# Patient Record
Sex: MALE | Race: WHITE | NOT HISPANIC OR LATINO | Employment: OTHER | URBAN - NONMETROPOLITAN AREA
[De-identification: names, ages, dates, MRNs, and addresses within clinical notes are randomized per-mention and may not be internally consistent; named-entity substitution may affect disease eponyms.]

---

## 2018-01-01 ENCOUNTER — APPOINTMENT (OUTPATIENT)
Dept: GENERAL RADIOLOGY | Facility: HOSPITAL | Age: 83
End: 2018-01-01

## 2018-01-01 ENCOUNTER — APPOINTMENT (OUTPATIENT)
Dept: CT IMAGING | Facility: HOSPITAL | Age: 83
End: 2018-01-01

## 2018-01-01 ENCOUNTER — APPOINTMENT (OUTPATIENT)
Dept: CARDIOLOGY | Facility: HOSPITAL | Age: 83
End: 2018-01-01
Attending: FAMILY MEDICINE

## 2018-01-01 ENCOUNTER — HOSPITAL ENCOUNTER (INPATIENT)
Facility: HOSPITAL | Age: 83
LOS: 1 days | End: 2018-09-22
Attending: INTERNAL MEDICINE | Admitting: INTERNAL MEDICINE

## 2018-01-01 ENCOUNTER — HOSPITAL ENCOUNTER (INPATIENT)
Facility: HOSPITAL | Age: 83
LOS: 9 days | End: 2018-09-21
Attending: EMERGENCY MEDICINE | Admitting: INTERNAL MEDICINE

## 2018-01-01 ENCOUNTER — TELEPHONE (OUTPATIENT)
Dept: GENERAL RADIOLOGY | Facility: HOSPITAL | Age: 83
End: 2018-01-01

## 2018-01-01 ENCOUNTER — APPOINTMENT (OUTPATIENT)
Dept: ULTRASOUND IMAGING | Facility: HOSPITAL | Age: 83
End: 2018-01-01

## 2018-01-01 VITALS
TEMPERATURE: 97.2 F | HEIGHT: 68 IN | BODY MASS INDEX: 23.79 KG/M2 | DIASTOLIC BLOOD PRESSURE: 62 MMHG | WEIGHT: 157 LBS | HEART RATE: 100 BPM | RESPIRATION RATE: 20 BRPM | OXYGEN SATURATION: 89 % | SYSTOLIC BLOOD PRESSURE: 136 MMHG

## 2018-01-01 DIAGNOSIS — J18.9 PNEUMONIA OF RIGHT UPPER LOBE DUE TO INFECTIOUS ORGANISM: Primary | ICD-10-CM

## 2018-01-01 DIAGNOSIS — R09.02 HYPOXIA: ICD-10-CM

## 2018-01-01 DIAGNOSIS — J90 PLEURAL EFFUSION: ICD-10-CM

## 2018-01-01 DIAGNOSIS — C61 PROSTATE CANCER (HCC): Primary | ICD-10-CM

## 2018-01-01 DIAGNOSIS — R97.20 ELEVATED PROSTATE SPECIFIC ANTIGEN (PSA): ICD-10-CM

## 2018-01-01 LAB
ALBUMIN SERPL-MCNC: 3.7 G/DL (ref 3.4–4.8)
ALBUMIN/GLOB SERPL: 0.9 G/DL (ref 1.1–1.8)
ALP SERPL-CCNC: 120 U/L (ref 38–126)
ALT SERPL W P-5'-P-CCNC: 26 U/L (ref 21–72)
ANION GAP SERPL CALCULATED.3IONS-SCNC: 11 MMOL/L (ref 5–15)
ANION GAP SERPL CALCULATED.3IONS-SCNC: 12 MMOL/L (ref 5–15)
ANION GAP SERPL CALCULATED.3IONS-SCNC: 12 MMOL/L (ref 5–15)
ANION GAP SERPL CALCULATED.3IONS-SCNC: 6 MMOL/L (ref 5–15)
ANION GAP SERPL CALCULATED.3IONS-SCNC: 7 MMOL/L (ref 5–15)
ANION GAP SERPL CALCULATED.3IONS-SCNC: 7 MMOL/L (ref 5–15)
ANION GAP SERPL CALCULATED.3IONS-SCNC: 8 MMOL/L (ref 5–15)
ANION GAP SERPL CALCULATED.3IONS-SCNC: 8 MMOL/L (ref 5–15)
ANION GAP SERPL CALCULATED.3IONS-SCNC: 9 MMOL/L (ref 5–15)
APPEARANCE FLD: ABNORMAL
ARTERIAL PATENCY WRIST A: ABNORMAL
ARTERIAL PATENCY WRIST A: POSITIVE
AST SERPL-CCNC: 28 U/L (ref 17–59)
ATMOSPHERIC PRESS: 748 MMHG
ATMOSPHERIC PRESS: 750 MMHG
ATMOSPHERIC PRESS: 751 MMHG
B PERT DNA SPEC QL NAA+PROBE: NOT DETECTED
BACTERIA FLD CULT: NORMAL
BACTERIA SPEC AEROBE CULT: NORMAL
BACTERIA SPEC AEROBE CULT: NORMAL
BASE EXCESS BLDA CALC-SCNC: -1.4 MMOL/L (ref 0–2)
BASE EXCESS BLDA CALC-SCNC: -2.9 MMOL/L (ref 0–2)
BASE EXCESS BLDA CALC-SCNC: -2.9 MMOL/L (ref 0–2)
BASE EXCESS BLDA CALC-SCNC: 0.7 MMOL/L (ref 0–2)
BASE EXCESS BLDA CALC-SCNC: 6.6 MMOL/L (ref 0–2)
BASOPHILS # BLD AUTO: 0 10*3/MM3 (ref 0–0.2)
BASOPHILS # BLD AUTO: 0.01 10*3/MM3 (ref 0–0.2)
BASOPHILS # BLD AUTO: 0.02 10*3/MM3 (ref 0–0.2)
BASOPHILS NFR BLD AUTO: 0 % (ref 0–2)
BASOPHILS NFR BLD AUTO: 0.1 % (ref 0–2)
BDY SITE: ABNORMAL
BH CV ECHO MEAS - ACS: 1.5 CM
BH CV ECHO MEAS - AO ISTHMUS: 2.5 CM
BH CV ECHO MEAS - AO MAX PG (FULL): 9.9 MMHG
BH CV ECHO MEAS - AO MAX PG: 19.7 MMHG
BH CV ECHO MEAS - AO MEAN PG (FULL): 5 MMHG
BH CV ECHO MEAS - AO MEAN PG: 10 MMHG
BH CV ECHO MEAS - AO ROOT AREA (BSA CORRECTED): 1.8
BH CV ECHO MEAS - AO ROOT AREA: 9.1 CM^2
BH CV ECHO MEAS - AO ROOT DIAM: 3.4 CM
BH CV ECHO MEAS - AO V2 MAX: 222 CM/SEC
BH CV ECHO MEAS - AO V2 MEAN: 144 CM/SEC
BH CV ECHO MEAS - AO V2 VTI: 29.3 CM
BH CV ECHO MEAS - ASC AORTA: 2.9 CM
BH CV ECHO MEAS - AVA(I,A): 2.8 CM^2
BH CV ECHO MEAS - AVA(I,D): 2.8 CM^2
BH CV ECHO MEAS - AVA(V,A): 2.4 CM^2
BH CV ECHO MEAS - AVA(V,D): 2.4 CM^2
BH CV ECHO MEAS - BSA(HAYCOCK): 1.9 M^2
BH CV ECHO MEAS - BSA: 1.9 M^2
BH CV ECHO MEAS - BZI_BMI: 25.2 KILOGRAMS/M^2
BH CV ECHO MEAS - BZI_METRIC_HEIGHT: 172.7 CM
BH CV ECHO MEAS - BZI_METRIC_WEIGHT: 75.3 KG
BH CV ECHO MEAS - EDV(CUBED): 58.4 ML
BH CV ECHO MEAS - EDV(MOD-SP2): 28.1 ML
BH CV ECHO MEAS - EDV(MOD-SP4): 69 ML
BH CV ECHO MEAS - EDV(TEICH): 65.1 ML
BH CV ECHO MEAS - EF(CUBED): 93 %
BH CV ECHO MEAS - EF(MOD-SP2): 58 %
BH CV ECHO MEAS - EF(MOD-SP4): 56.8 %
BH CV ECHO MEAS - EF(TEICH): 89 %
BH CV ECHO MEAS - ESV(CUBED): 4.1 ML
BH CV ECHO MEAS - ESV(MOD-SP2): 11.8 ML
BH CV ECHO MEAS - ESV(MOD-SP4): 29.8 ML
BH CV ECHO MEAS - ESV(TEICH): 7.2 ML
BH CV ECHO MEAS - FS: 58.8 %
BH CV ECHO MEAS - IVS/LVPW: 1.6
BH CV ECHO MEAS - IVSD: 1.5 CM
BH CV ECHO MEAS - LA DIMENSION: 3.6 CM
BH CV ECHO MEAS - LA/AO: 1.1
BH CV ECHO MEAS - LV DIASTOLIC VOL/BSA (35-75): 36.5 ML/M^2
BH CV ECHO MEAS - LV MASS(C)D: 158.5 GRAMS
BH CV ECHO MEAS - LV MASS(C)DI: 83.9 GRAMS/M^2
BH CV ECHO MEAS - LV MAX PG: 9.9 MMHG
BH CV ECHO MEAS - LV MEAN PG: 5 MMHG
BH CV ECHO MEAS - LV SYSTOLIC VOL/BSA (12-30): 15.8 ML/M^2
BH CV ECHO MEAS - LV V1 MAX: 157 CM/SEC
BH CV ECHO MEAS - LV V1 MEAN: 103 CM/SEC
BH CV ECHO MEAS - LV V1 VTI: 23.8 CM
BH CV ECHO MEAS - LVIDD: 3.9 CM
BH CV ECHO MEAS - LVIDS: 1.6 CM
BH CV ECHO MEAS - LVLD AP2: 6 CM
BH CV ECHO MEAS - LVLD AP4: 6.4 CM
BH CV ECHO MEAS - LVLS AP2: 4.4 CM
BH CV ECHO MEAS - LVLS AP4: 4.8 CM
BH CV ECHO MEAS - LVOT AREA (M): 3.5 CM^2
BH CV ECHO MEAS - LVOT AREA: 3.5 CM^2
BH CV ECHO MEAS - LVOT DIAM: 2.1 CM
BH CV ECHO MEAS - LVPWD: 0.92 CM
BH CV ECHO MEAS - MV A MAX VEL: 115 CM/SEC
BH CV ECHO MEAS - MV DEC SLOPE: 742 CM/SEC^2
BH CV ECHO MEAS - MV E MAX VEL: 88.7 CM/SEC
BH CV ECHO MEAS - MV E/A: 0.77
BH CV ECHO MEAS - MV MAX PG: 7.3 MMHG
BH CV ECHO MEAS - MV MEAN PG: 4 MMHG
BH CV ECHO MEAS - MV P1/2T MAX VEL: 97.7 CM/SEC
BH CV ECHO MEAS - MV P1/2T: 38.6 MSEC
BH CV ECHO MEAS - MV V2 MAX: 135 CM/SEC
BH CV ECHO MEAS - MV V2 MEAN: 90.6 CM/SEC
BH CV ECHO MEAS - MV V2 VTI: 24.5 CM
BH CV ECHO MEAS - MVA P1/2T LCG: 2.3 CM^2
BH CV ECHO MEAS - MVA(P1/2T): 5.7 CM^2
BH CV ECHO MEAS - MVA(VTI): 3.4 CM^2
BH CV ECHO MEAS - PA MAX PG: 11.2 MMHG
BH CV ECHO MEAS - PA V2 MAX: 167 CM/SEC
BH CV ECHO MEAS - RAP SYSTOLE: 5 MMHG
BH CV ECHO MEAS - RVDD: 2.2 CM
BH CV ECHO MEAS - RVSP: 62 MMHG
BH CV ECHO MEAS - SI(AO): 140.9 ML/M^2
BH CV ECHO MEAS - SI(CUBED): 28.8 ML/M^2
BH CV ECHO MEAS - SI(LVOT): 43.7 ML/M^2
BH CV ECHO MEAS - SI(MOD-SP2): 8.6 ML/M^2
BH CV ECHO MEAS - SI(MOD-SP4): 20.8 ML/M^2
BH CV ECHO MEAS - SI(TEICH): 30.7 ML/M^2
BH CV ECHO MEAS - SV(AO): 266 ML
BH CV ECHO MEAS - SV(CUBED): 54.3 ML
BH CV ECHO MEAS - SV(LVOT): 82.4 ML
BH CV ECHO MEAS - SV(MOD-SP2): 16.3 ML
BH CV ECHO MEAS - SV(MOD-SP4): 39.2 ML
BH CV ECHO MEAS - SV(TEICH): 57.9 ML
BH CV ECHO MEAS - TR MAX VEL: 373 CM/SEC
BILIRUB SERPL-MCNC: 0.5 MG/DL (ref 0.2–1.3)
BUN BLD-MCNC: 20 MG/DL (ref 7–21)
BUN BLD-MCNC: 24 MG/DL (ref 7–21)
BUN BLD-MCNC: 27 MG/DL (ref 7–21)
BUN BLD-MCNC: 29 MG/DL (ref 7–21)
BUN BLD-MCNC: 30 MG/DL (ref 7–21)
BUN BLD-MCNC: 36 MG/DL (ref 7–21)
BUN BLD-MCNC: 42 MG/DL (ref 7–21)
BUN BLD-MCNC: 43 MG/DL (ref 7–21)
BUN BLD-MCNC: 43 MG/DL (ref 7–21)
BUN/CREAT SERPL: 22.1 (ref 7–25)
BUN/CREAT SERPL: 23.3 (ref 7–25)
BUN/CREAT SERPL: 24.7 (ref 7–25)
BUN/CREAT SERPL: 31.8 (ref 7–25)
BUN/CREAT SERPL: 33.7 (ref 7–25)
BUN/CREAT SERPL: 40.4 (ref 7–25)
BUN/CREAT SERPL: 48.3 (ref 7–25)
BUN/CREAT SERPL: 49.4 (ref 7–25)
BUN/CREAT SERPL: 50 (ref 7–25)
C PNEUM DNA NPH QL NAA+NON-PROBE: NOT DETECTED
CALCIUM SPEC-SCNC: 10 MG/DL (ref 8.4–10.2)
CALCIUM SPEC-SCNC: 8.9 MG/DL (ref 8.4–10.2)
CALCIUM SPEC-SCNC: 8.9 MG/DL (ref 8.4–10.2)
CALCIUM SPEC-SCNC: 9.2 MG/DL (ref 8.4–10.2)
CALCIUM SPEC-SCNC: 9.3 MG/DL (ref 8.4–10.2)
CALCIUM SPEC-SCNC: 9.4 MG/DL (ref 8.4–10.2)
CALCIUM SPEC-SCNC: 9.4 MG/DL (ref 8.4–10.2)
CHLORIDE SERPL-SCNC: 100 MMOL/L (ref 95–110)
CHLORIDE SERPL-SCNC: 101 MMOL/L (ref 95–110)
CHLORIDE SERPL-SCNC: 101 MMOL/L (ref 95–110)
CHLORIDE SERPL-SCNC: 102 MMOL/L (ref 95–110)
CHLORIDE SERPL-SCNC: 102 MMOL/L (ref 95–110)
CHLORIDE SERPL-SCNC: 105 MMOL/L (ref 95–110)
CHLORIDE SERPL-SCNC: 96 MMOL/L (ref 95–110)
CHLORIDE SERPL-SCNC: 96 MMOL/L (ref 95–110)
CHLORIDE SERPL-SCNC: 97 MMOL/L (ref 95–110)
CO2 SERPL-SCNC: 24 MMOL/L (ref 22–31)
CO2 SERPL-SCNC: 25 MMOL/L (ref 22–31)
CO2 SERPL-SCNC: 26 MMOL/L (ref 22–31)
CO2 SERPL-SCNC: 29 MMOL/L (ref 22–31)
CO2 SERPL-SCNC: 30 MMOL/L (ref 22–31)
CO2 SERPL-SCNC: 33 MMOL/L (ref 22–31)
CO2 SERPL-SCNC: 34 MMOL/L (ref 22–31)
CO2 SERPL-SCNC: 34 MMOL/L (ref 22–31)
CO2 SERPL-SCNC: 35 MMOL/L (ref 22–31)
COLOR FLD: ABNORMAL
CPAP: 8 CMH2O
CREAT BLD-MCNC: 0.85 MG/DL (ref 0.7–1.3)
CREAT BLD-MCNC: 0.85 MG/DL (ref 0.7–1.3)
CREAT BLD-MCNC: 0.86 MG/DL (ref 0.7–1.3)
CREAT BLD-MCNC: 0.86 MG/DL (ref 0.7–1.3)
CREAT BLD-MCNC: 0.89 MG/DL (ref 0.7–1.3)
CREAT BLD-MCNC: 0.97 MG/DL (ref 0.7–1.3)
CREAT BLD-MCNC: 1.31 MG/DL (ref 0.7–1.3)
D-DIMER, QUANTITATIVE (MAD,POW, STR): 1613 NG/ML (FEU) (ref 0–470)
D-LACTATE SERPL-SCNC: 1.5 MMOL/L (ref 0.5–2)
D-LACTATE SERPL-SCNC: 1.5 MMOL/L (ref 0.5–2)
D-LACTATE SERPL-SCNC: 1.9 MMOL/L (ref 0.5–2)
D-LACTATE SERPL-SCNC: 2.5 MMOL/L (ref 0.5–2)
D-LACTATE SERPL-SCNC: 3.2 MMOL/L (ref 0.5–2)
D-LACTATE SERPL-SCNC: 5.3 MMOL/L (ref 0.5–2)
D-LACTATE SERPL-SCNC: 5.4 MMOL/L (ref 0.5–2)
DEPRECATED RDW RBC AUTO: 45.9 FL (ref 35.1–43.9)
DEPRECATED RDW RBC AUTO: 46 FL (ref 35.1–43.9)
DEPRECATED RDW RBC AUTO: 46.1 FL (ref 35.1–43.9)
DEPRECATED RDW RBC AUTO: 47.3 FL (ref 35.1–43.9)
DEPRECATED RDW RBC AUTO: 47.6 FL (ref 35.1–43.9)
DEPRECATED RDW RBC AUTO: 47.6 FL (ref 35.1–43.9)
DEPRECATED RDW RBC AUTO: 48 FL (ref 35.1–43.9)
DEPRECATED RDW RBC AUTO: 48.8 FL (ref 35.1–43.9)
EOSINOPHIL # BLD AUTO: 0 10*3/MM3 (ref 0–0.7)
EOSINOPHIL # BLD AUTO: 0.02 10*3/MM3 (ref 0–0.7)
EOSINOPHIL # BLD AUTO: 0.02 10*3/MM3 (ref 0–0.7)
EOSINOPHIL # BLD AUTO: 0.25 10*3/MM3 (ref 0–0.7)
EOSINOPHIL # BLD AUTO: 0.28 10*3/MM3 (ref 0–0.7)
EOSINOPHIL NFR BLD AUTO: 0 % (ref 0–7)
EOSINOPHIL NFR BLD AUTO: 0.1 % (ref 0–7)
EOSINOPHIL NFR BLD AUTO: 0.1 % (ref 0–7)
EOSINOPHIL NFR BLD AUTO: 1.2 % (ref 0–7)
EOSINOPHIL NFR BLD AUTO: 1.8 % (ref 0–7)
ERYTHROCYTE [DISTWIDTH] IN BLOOD BY AUTOMATED COUNT: 14.3 % (ref 11.5–14.5)
ERYTHROCYTE [DISTWIDTH] IN BLOOD BY AUTOMATED COUNT: 14.3 % (ref 11.5–14.5)
ERYTHROCYTE [DISTWIDTH] IN BLOOD BY AUTOMATED COUNT: 14.4 % (ref 11.5–14.5)
ERYTHROCYTE [DISTWIDTH] IN BLOOD BY AUTOMATED COUNT: 14.5 % (ref 11.5–14.5)
ERYTHROCYTE [DISTWIDTH] IN BLOOD BY AUTOMATED COUNT: 14.6 % (ref 11.5–14.5)
ERYTHROCYTE [DISTWIDTH] IN BLOOD BY AUTOMATED COUNT: 14.7 % (ref 11.5–14.5)
ERYTHROCYTE [DISTWIDTH] IN BLOOD BY AUTOMATED COUNT: 14.7 % (ref 11.5–14.5)
ERYTHROCYTE [DISTWIDTH] IN BLOOD BY AUTOMATED COUNT: 14.8 % (ref 11.5–14.5)
FLUAV H1 2009 PAND RNA NPH QL NAA+PROBE: NOT DETECTED
FLUAV H1 HA GENE NPH QL NAA+PROBE: NOT DETECTED
FLUAV H3 RNA NPH QL NAA+PROBE: NOT DETECTED
FLUAV SUBTYP SPEC NAA+PROBE: NOT DETECTED
FLUBV RNA ISLT QL NAA+PROBE: NOT DETECTED
GAS FLOW AIRWAY: 15 LPM
GAS FLOW AIRWAY: 15 LPM
GAS FLOW AIRWAY: 4 LPM
GFR SERPL CREATININE-BSD FRML MDRD: 53 ML/MIN/1.73 (ref 42–98)
GFR SERPL CREATININE-BSD FRML MDRD: 74 ML/MIN/1.73 (ref 42–98)
GFR SERPL CREATININE-BSD FRML MDRD: 82 ML/MIN/1.73 (ref 42–98)
GFR SERPL CREATININE-BSD FRML MDRD: 85 ML/MIN/1.73 (ref 42–98)
GFR SERPL CREATININE-BSD FRML MDRD: 85 ML/MIN/1.73 (ref 42–98)
GFR SERPL CREATININE-BSD FRML MDRD: 87 ML/MIN/1.73 (ref 42–98)
GFR SERPL CREATININE-BSD FRML MDRD: 87 ML/MIN/1.73 (ref 42–98)
GLOBULIN UR ELPH-MCNC: 3.9 GM/DL (ref 2.3–3.5)
GLUCOSE BLD-MCNC: 136 MG/DL (ref 60–100)
GLUCOSE BLD-MCNC: 137 MG/DL (ref 60–100)
GLUCOSE BLD-MCNC: 142 MG/DL (ref 60–100)
GLUCOSE BLD-MCNC: 151 MG/DL (ref 60–100)
GLUCOSE BLD-MCNC: 170 MG/DL (ref 60–100)
GLUCOSE BLD-MCNC: 178 MG/DL (ref 60–100)
GLUCOSE BLD-MCNC: 189 MG/DL (ref 60–100)
GLUCOSE BLD-MCNC: 192 MG/DL (ref 60–100)
GLUCOSE BLD-MCNC: 204 MG/DL (ref 60–100)
GLUCOSE BLDC GLUCOMTR-MCNC: 154 MG/DL (ref 70–130)
GLUCOSE BLDC GLUCOMTR-MCNC: 157 MG/DL (ref 70–130)
GLUCOSE BLDC GLUCOMTR-MCNC: 169 MG/DL (ref 70–130)
GLUCOSE BLDC GLUCOMTR-MCNC: 197 MG/DL (ref 70–130)
GLUCOSE BLDC GLUCOMTR-MCNC: 205 MG/DL (ref 70–130)
GLUCOSE BLDC GLUCOMTR-MCNC: 213 MG/DL (ref 70–130)
GLUCOSE BLDC GLUCOMTR-MCNC: 257 MG/DL (ref 70–130)
GRAM STN SPEC: NORMAL
GRAM STN SPEC: NORMAL
HADV DNA SPEC NAA+PROBE: NOT DETECTED
HCO3 BLDA-SCNC: 20.9 MMOL/L (ref 20–26)
HCO3 BLDA-SCNC: 20.9 MMOL/L (ref 20–26)
HCO3 BLDA-SCNC: 22.8 MMOL/L (ref 20–26)
HCO3 BLDA-SCNC: 26.1 MMOL/L (ref 20–26)
HCO3 BLDA-SCNC: 31.6 MMOL/L (ref 20–26)
HCOV 229E RNA SPEC QL NAA+PROBE: NOT DETECTED
HCOV HKU1 RNA SPEC QL NAA+PROBE: NOT DETECTED
HCOV NL63 RNA SPEC QL NAA+PROBE: NOT DETECTED
HCOV OC43 RNA SPEC QL NAA+PROBE: NOT DETECTED
HCT VFR BLD AUTO: 39.4 % (ref 39–49)
HCT VFR BLD AUTO: 39.5 % (ref 39–49)
HCT VFR BLD AUTO: 40.4 % (ref 39–49)
HCT VFR BLD AUTO: 40.8 % (ref 39–49)
HCT VFR BLD AUTO: 42 % (ref 39–49)
HCT VFR BLD AUTO: 42.1 % (ref 39–49)
HCT VFR BLD AUTO: 43.2 % (ref 39–49)
HCT VFR BLD AUTO: 43.8 % (ref 39–49)
HGB BLD-MCNC: 13.3 G/DL (ref 13.7–17.3)
HGB BLD-MCNC: 13.4 G/DL (ref 13.7–17.3)
HGB BLD-MCNC: 13.8 G/DL (ref 13.7–17.3)
HGB BLD-MCNC: 13.8 G/DL (ref 13.7–17.3)
HGB BLD-MCNC: 14.2 G/DL (ref 13.7–17.3)
HGB BLD-MCNC: 14.2 G/DL (ref 13.7–17.3)
HGB BLD-MCNC: 14.7 G/DL (ref 13.7–17.3)
HGB BLD-MCNC: 14.7 G/DL (ref 13.7–17.3)
HMPV RNA NPH QL NAA+NON-PROBE: NOT DETECTED
HOLD SPECIMEN: NORMAL
HOROWITZ INDEX BLD+IHG-RTO: 100 %
HPIV1 RNA SPEC QL NAA+PROBE: NOT DETECTED
HPIV2 RNA SPEC QL NAA+PROBE: NOT DETECTED
HPIV3 RNA NPH QL NAA+PROBE: NOT DETECTED
HPIV4 P GENE NPH QL NAA+PROBE: NOT DETECTED
IMM GRANULOCYTES # BLD: 0.04 10*3/MM3 (ref 0–0.02)
IMM GRANULOCYTES # BLD: 0.11 10*3/MM3 (ref 0–0.02)
IMM GRANULOCYTES # BLD: 0.11 10*3/MM3 (ref 0–0.02)
IMM GRANULOCYTES # BLD: 0.13 10*3/MM3 (ref 0–0.02)
IMM GRANULOCYTES # BLD: 0.14 10*3/MM3 (ref 0–0.02)
IMM GRANULOCYTES # BLD: 0.14 10*3/MM3 (ref 0–0.02)
IMM GRANULOCYTES # BLD: 0.19 10*3/MM3 (ref 0–0.02)
IMM GRANULOCYTES NFR BLD: 0.3 % (ref 0–0.5)
IMM GRANULOCYTES NFR BLD: 0.5 % (ref 0–0.5)
IMM GRANULOCYTES NFR BLD: 0.6 % (ref 0–0.5)
IMM GRANULOCYTES NFR BLD: 0.7 % (ref 0–0.5)
IMM GRANULOCYTES NFR BLD: 0.8 % (ref 0–0.5)
INR PPP: 1.02 (ref 0.8–1.2)
L PNEUMO1 AG UR QL IA: NEGATIVE
LAB AP CASE REPORT: NORMAL
LAB AP DIAGNOSIS COMMENT: NORMAL
LAB AP SPECIAL STAINS: NORMAL
LDH FLD-CCNC: 1150 U/L
LYMPHOCYTES # BLD AUTO: 0.4 10*3/MM3 (ref 0.6–4.2)
LYMPHOCYTES # BLD AUTO: 0.43 10*3/MM3 (ref 0.6–4.2)
LYMPHOCYTES # BLD AUTO: 0.54 10*3/MM3 (ref 0.6–4.2)
LYMPHOCYTES # BLD AUTO: 1.03 10*3/MM3 (ref 0.6–4.2)
LYMPHOCYTES # BLD AUTO: 1.65 10*3/MM3 (ref 0.6–4.2)
LYMPHOCYTES # BLD AUTO: 1.72 10*3/MM3 (ref 0.6–4.2)
LYMPHOCYTES # BLD AUTO: 2.15 10*3/MM3 (ref 0.6–4.2)
LYMPHOCYTES # BLD MANUAL: 0.82 10*3/MM3 (ref 0.6–4.2)
LYMPHOCYTES NFR BLD AUTO: 12.6 % (ref 10–50)
LYMPHOCYTES NFR BLD AUTO: 2.1 % (ref 10–50)
LYMPHOCYTES NFR BLD AUTO: 5.2 % (ref 10–50)
LYMPHOCYTES NFR BLD AUTO: 6.4 % (ref 10–50)
LYMPHOCYTES NFR BLD AUTO: 9.6 % (ref 10–50)
LYMPHOCYTES NFR BLD MANUAL: 4 % (ref 10–50)
LYMPHOCYTES NFR BLD MANUAL: 5 % (ref 0–12)
Lab: ABNORMAL
M PNEUMO IGG SER IA-ACNC: NOT DETECTED
MAXIMAL PREDICTED HEART RATE: 139 BPM
MCH RBC QN AUTO: 29.7 PG (ref 26.5–34)
MCH RBC QN AUTO: 29.7 PG (ref 26.5–34)
MCH RBC QN AUTO: 29.8 PG (ref 26.5–34)
MCH RBC QN AUTO: 29.9 PG (ref 26.5–34)
MCH RBC QN AUTO: 29.9 PG (ref 26.5–34)
MCH RBC QN AUTO: 30 PG (ref 26.5–34)
MCH RBC QN AUTO: 30.1 PG (ref 26.5–34)
MCH RBC QN AUTO: 30.4 PG (ref 26.5–34)
MCHC RBC AUTO-ENTMCNC: 33.6 G/DL (ref 31.5–36.3)
MCHC RBC AUTO-ENTMCNC: 33.7 G/DL (ref 31.5–36.3)
MCHC RBC AUTO-ENTMCNC: 33.8 G/DL (ref 31.5–36.3)
MCHC RBC AUTO-ENTMCNC: 33.9 G/DL (ref 31.5–36.3)
MCHC RBC AUTO-ENTMCNC: 34 G/DL (ref 31.5–36.3)
MCHC RBC AUTO-ENTMCNC: 34.2 G/DL (ref 31.5–36.3)
MCV RBC AUTO: 87.3 FL (ref 80–98)
MCV RBC AUTO: 87.6 FL (ref 80–98)
MCV RBC AUTO: 87.8 FL (ref 80–98)
MCV RBC AUTO: 87.9 FL (ref 80–98)
MCV RBC AUTO: 88.4 FL (ref 80–98)
MCV RBC AUTO: 88.7 FL (ref 80–98)
MCV RBC AUTO: 89.2 FL (ref 80–98)
MCV RBC AUTO: 90.7 FL (ref 80–98)
MODALITY: ABNORMAL
MONOCYTES # BLD AUTO: 0.62 10*3/MM3 (ref 0–0.9)
MONOCYTES # BLD AUTO: 0.63 10*3/MM3 (ref 0–0.9)
MONOCYTES # BLD AUTO: 1.02 10*3/MM3 (ref 0–0.9)
MONOCYTES # BLD AUTO: 1.02 10*3/MM3 (ref 0–0.9)
MONOCYTES # BLD AUTO: 1.06 10*3/MM3 (ref 0–0.9)
MONOCYTES # BLD AUTO: 1.24 10*3/MM3 (ref 0–0.9)
MONOCYTES # BLD AUTO: 1.38 10*3/MM3 (ref 0–0.9)
MONOCYTES # BLD AUTO: 1.49 10*3/MM3 (ref 0–0.9)
MONOCYTES NFR BLD AUTO: 2.8 % (ref 0–12)
MONOCYTES NFR BLD AUTO: 3 % (ref 0–12)
MONOCYTES NFR BLD AUTO: 4.1 % (ref 0–12)
MONOCYTES NFR BLD AUTO: 5.3 % (ref 0–12)
MONOCYTES NFR BLD AUTO: 5.7 % (ref 0–12)
MONOCYTES NFR BLD AUTO: 7 % (ref 0–12)
MONOCYTES NFR BLD AUTO: 9.1 % (ref 0–12)
MONOS+MACROS NFR FLD: 65 %
NEUTROPHILS # BLD AUTO: 10.43 10*3/MM3 (ref 2–8.6)
NEUTROPHILS # BLD AUTO: 17.19 10*3/MM3 (ref 2–8.6)
NEUTROPHILS # BLD AUTO: 17.78 10*3/MM3 (ref 2–8.6)
NEUTROPHILS # BLD AUTO: 18.65 10*3/MM3 (ref 2–8.6)
NEUTROPHILS # BLD AUTO: 19.18 10*3/MM3 (ref 2–8.6)
NEUTROPHILS # BLD AUTO: 19.67 10*3/MM3 (ref 2–8.6)
NEUTROPHILS # BLD AUTO: 23.05 10*3/MM3 (ref 2–8.6)
NEUTROPHILS # BLD AUTO: 24 10*3/MM3 (ref 2–8.6)
NEUTROPHILS NFR BLD AUTO: 76.1 % (ref 37–80)
NEUTROPHILS NFR BLD AUTO: 85.5 % (ref 37–80)
NEUTROPHILS NFR BLD AUTO: 87 % (ref 37–80)
NEUTROPHILS NFR BLD AUTO: 88.8 % (ref 37–80)
NEUTROPHILS NFR BLD AUTO: 91.7 % (ref 37–80)
NEUTROPHILS NFR BLD AUTO: 91.9 % (ref 37–80)
NEUTROPHILS NFR BLD AUTO: 94.4 % (ref 37–80)
NEUTROPHILS NFR BLD MANUAL: 91 % (ref 37–80)
NEUTROPHILS NFR FLD MANUAL: 35 %
NRBC BLD MANUAL-RTO: 0 /100 WBC (ref 0–0)
NRBC BLD MANUAL-RTO: 0 /100 WBC (ref 0–0)
NT-PROBNP SERPL-MCNC: 196 PG/ML (ref 0–1800)
PATH REPORT.FINAL DX SPEC: NORMAL
PCO2 BLDA: 32.9 MM HG (ref 35–45)
PCO2 BLDA: 32.9 MM HG (ref 35–45)
PCO2 BLDA: 36.2 MM HG (ref 35–45)
PCO2 BLDA: 43.2 MM HG (ref 35–45)
PCO2 BLDA: 45 MM HG (ref 35–45)
PH BLDA: 7.39 PH UNITS (ref 7.35–7.45)
PH BLDA: 7.41 PH UNITS (ref 7.35–7.45)
PH BLDA: 7.46 PH UNITS (ref 7.35–7.45)
PLAT MORPH BLD: NORMAL
PLATELET # BLD AUTO: 268 10*3/MM3 (ref 150–450)
PLATELET # BLD AUTO: 270 10*3/MM3 (ref 150–450)
PLATELET # BLD AUTO: 281 10*3/MM3 (ref 150–450)
PLATELET # BLD AUTO: 287 10*3/MM3 (ref 150–450)
PLATELET # BLD AUTO: 302 10*3/MM3 (ref 150–450)
PLATELET # BLD AUTO: 311 10*3/MM3 (ref 150–450)
PLATELET # BLD AUTO: 335 10*3/MM3 (ref 150–450)
PLATELET # BLD AUTO: 342 10*3/MM3 (ref 150–450)
PMV BLD AUTO: 10.5 FL (ref 8–12)
PMV BLD AUTO: 10.5 FL (ref 8–12)
PMV BLD AUTO: 10.7 FL (ref 8–12)
PMV BLD AUTO: 10.7 FL (ref 8–12)
PMV BLD AUTO: 11 FL (ref 8–12)
PMV BLD AUTO: 11 FL (ref 8–12)
PMV BLD AUTO: 11.2 FL (ref 8–12)
PMV BLD AUTO: 11.5 FL (ref 8–12)
PO2 BLDA: 157 MM HG (ref 83–108)
PO2 BLDA: 62.3 MM HG (ref 83–108)
PO2 BLDA: 70.1 MM HG (ref 83–108)
PO2 BLDA: 70.1 MM HG (ref 83–108)
PO2 BLDA: 96.4 MM HG (ref 83–108)
POTASSIUM BLD-SCNC: 3.9 MMOL/L (ref 3.5–5.1)
POTASSIUM BLD-SCNC: 3.9 MMOL/L (ref 3.5–5.1)
POTASSIUM BLD-SCNC: 4.2 MMOL/L (ref 3.5–5.1)
POTASSIUM BLD-SCNC: 4.3 MMOL/L (ref 3.5–5.1)
POTASSIUM BLD-SCNC: 4.3 MMOL/L (ref 3.5–5.1)
POTASSIUM BLD-SCNC: 4.4 MMOL/L (ref 3.5–5.1)
POTASSIUM BLD-SCNC: 4.5 MMOL/L (ref 3.5–5.1)
POTASSIUM BLD-SCNC: 4.6 MMOL/L (ref 3.5–5.1)
POTASSIUM BLD-SCNC: 4.8 MMOL/L (ref 3.5–5.1)
PROT FLD-MCNC: 5 G/DL
PROT SERPL-MCNC: 7.6 G/DL (ref 6.3–8.6)
PROTHROMBIN TIME: 13.2 SECONDS (ref 11.1–15.3)
RBC # BLD AUTO: 4.48 10*6/MM3 (ref 4.37–5.74)
RBC # BLD AUTO: 4.51 10*6/MM3 (ref 4.37–5.74)
RBC # BLD AUTO: 4.6 10*6/MM3 (ref 4.37–5.74)
RBC # BLD AUTO: 4.63 10*6/MM3 (ref 4.37–5.74)
RBC # BLD AUTO: 4.72 10*6/MM3 (ref 4.37–5.74)
RBC # BLD AUTO: 4.75 10*6/MM3 (ref 4.37–5.74)
RBC # BLD AUTO: 4.83 10*6/MM3 (ref 4.37–5.74)
RBC # BLD AUTO: 4.92 10*6/MM3 (ref 4.37–5.74)
RBC # FLD AUTO: ABNORMAL /MM3 (ref 0–0)
RBC MORPH BLD: NORMAL
RHINOVIRUS RNA SPEC NAA+PROBE: NOT DETECTED
RSV RNA NPH QL NAA+NON-PROBE: NOT DETECTED
S PNEUM AG SPEC QL LA: NEGATIVE
SAO2 % BLDCOA: 91.7 % (ref 94–99)
SAO2 % BLDCOA: 93.7 % (ref 94–99)
SAO2 % BLDCOA: 93.7 % (ref 94–99)
SAO2 % BLDCOA: 97.7 % (ref 94–99)
SAO2 % BLDCOA: 99.4 % (ref 94–99)
SODIUM BLD-SCNC: 137 MMOL/L (ref 137–145)
SODIUM BLD-SCNC: 138 MMOL/L (ref 137–145)
SODIUM BLD-SCNC: 138 MMOL/L (ref 137–145)
SODIUM BLD-SCNC: 139 MMOL/L (ref 137–145)
SODIUM BLD-SCNC: 140 MMOL/L (ref 137–145)
SODIUM BLD-SCNC: 141 MMOL/L (ref 137–145)
SPECIMEN VOL FLD: 1650 ML
STAT OF ADQ CVX/VAG CYTO-IMP: NORMAL
STRESS TARGET HR: 118 BPM
TROPONIN I SERPL-MCNC: <0.012 NG/ML
VENTILATOR MODE: ABNORMAL
WBC # FLD: 1162.5 /MM3 (ref 0–5)
WBC MORPH BLD: NORMAL
WBC NRBC COR # BLD: 13.7 10*3/MM3 (ref 3.2–9.8)
WBC NRBC COR # BLD: 19.33 10*3/MM3 (ref 3.2–9.8)
WBC NRBC COR # BLD: 19.74 10*3/MM3 (ref 3.2–9.8)
WBC NRBC COR # BLD: 20.49 10*3/MM3 (ref 3.2–9.8)
WBC NRBC COR # BLD: 20.85 10*3/MM3 (ref 3.2–9.8)
WBC NRBC COR # BLD: 22.44 10*3/MM3 (ref 3.2–9.8)
WBC NRBC COR # BLD: 25.94 10*3/MM3 (ref 3.2–9.8)
WBC NRBC COR # BLD: 26.18 10*3/MM3 (ref 3.2–9.8)
WHOLE BLOOD HOLD SPECIMEN: NORMAL

## 2018-01-01 PROCEDURE — 94799 UNLISTED PULMONARY SVC/PX: CPT

## 2018-01-01 PROCEDURE — 94660 CPAP INITIATION&MGMT: CPT

## 2018-01-01 PROCEDURE — 87205 SMEAR GRAM STAIN: CPT | Performed by: INTERNAL MEDICINE

## 2018-01-01 PROCEDURE — 36600 WITHDRAWAL OF ARTERIAL BLOOD: CPT

## 2018-01-01 PROCEDURE — 25010000002 LORAZEPAM PER 2 MG: Performed by: NURSE PRACTITIONER

## 2018-01-01 PROCEDURE — 93005 ELECTROCARDIOGRAM TRACING: CPT | Performed by: NURSE PRACTITIONER

## 2018-01-01 PROCEDURE — 99291 CRITICAL CARE FIRST HOUR: CPT | Performed by: INTERNAL MEDICINE

## 2018-01-01 PROCEDURE — 93010 ELECTROCARDIOGRAM REPORT: CPT | Performed by: INTERNAL MEDICINE

## 2018-01-01 PROCEDURE — 71275 CT ANGIOGRAPHY CHEST: CPT

## 2018-01-01 PROCEDURE — 25010000003 MORPHINE PER 10 MG: Performed by: NURSE PRACTITIONER

## 2018-01-01 PROCEDURE — 99232 SBSQ HOSP IP/OBS MODERATE 35: CPT | Performed by: INTERNAL MEDICINE

## 2018-01-01 PROCEDURE — 25010000002 MORPHINE PER 10 MG: Performed by: NURSE PRACTITIONER

## 2018-01-01 PROCEDURE — 87070 CULTURE OTHR SPECIMN AEROBIC: CPT | Performed by: INTERNAL MEDICINE

## 2018-01-01 PROCEDURE — 88305 TISSUE EXAM BY PATHOLOGIST: CPT | Performed by: PATHOLOGY

## 2018-01-01 PROCEDURE — 99233 SBSQ HOSP IP/OBS HIGH 50: CPT | Performed by: INTERNAL MEDICINE

## 2018-01-01 PROCEDURE — 0 IOPAMIDOL PER 1 ML: Performed by: FAMILY MEDICINE

## 2018-01-01 PROCEDURE — 89051 BODY FLUID CELL COUNT: CPT | Performed by: INTERNAL MEDICINE

## 2018-01-01 PROCEDURE — 25010000002 PROMETHAZINE PER 50 MG: Performed by: INTERNAL MEDICINE

## 2018-01-01 PROCEDURE — 25010000002 LORAZEPAM PER 2 MG: Performed by: HOSPITALIST

## 2018-01-01 PROCEDURE — 93005 ELECTROCARDIOGRAM TRACING: CPT | Performed by: INTERNAL MEDICINE

## 2018-01-01 PROCEDURE — 36600 WITHDRAWAL OF ARTERIAL BLOOD: CPT | Performed by: FAMILY MEDICINE

## 2018-01-01 PROCEDURE — 25010000002 MORPHINE PER 10 MG: Performed by: HOSPITALIST

## 2018-01-01 PROCEDURE — 88342 IMHCHEM/IMCYTCHM 1ST ANTB: CPT | Performed by: INTERNAL MEDICINE

## 2018-01-01 PROCEDURE — 94760 N-INVAS EAR/PLS OXIMETRY 1: CPT

## 2018-01-01 PROCEDURE — 25010000002 FUROSEMIDE PER 20 MG: Performed by: FAMILY MEDICINE

## 2018-01-01 PROCEDURE — 25010000002 FUROSEMIDE PER 20 MG: Performed by: INTERNAL MEDICINE

## 2018-01-01 PROCEDURE — 25010000002 DIPHENHYDRAMINE PER 50 MG: Performed by: NURSE PRACTITIONER

## 2018-01-01 PROCEDURE — 85025 COMPLETE CBC W/AUTO DIFF WBC: CPT | Performed by: FAMILY MEDICINE

## 2018-01-01 PROCEDURE — 87899 AGENT NOS ASSAY W/OPTIC: CPT | Performed by: FAMILY MEDICINE

## 2018-01-01 PROCEDURE — 87486 CHLMYD PNEUM DNA AMP PROBE: CPT | Performed by: FAMILY MEDICINE

## 2018-01-01 PROCEDURE — 87040 BLOOD CULTURE FOR BACTERIA: CPT | Performed by: EMERGENCY MEDICINE

## 2018-01-01 PROCEDURE — 80048 BASIC METABOLIC PNL TOTAL CA: CPT | Performed by: FAMILY MEDICINE

## 2018-01-01 PROCEDURE — 63710000001 INSULIN ASPART PER 5 UNITS: Performed by: INTERNAL MEDICINE

## 2018-01-01 PROCEDURE — 25010000002 ENOXAPARIN PER 10 MG: Performed by: INTERNAL MEDICINE

## 2018-01-01 PROCEDURE — 88305 TISSUE EXAM BY PATHOLOGIST: CPT | Performed by: INTERNAL MEDICINE

## 2018-01-01 PROCEDURE — 25010000002 METHYLPREDNISOLONE PER 125 MG: Performed by: EMERGENCY MEDICINE

## 2018-01-01 PROCEDURE — 82962 GLUCOSE BLOOD TEST: CPT

## 2018-01-01 PROCEDURE — 25010000002 CEFTRIAXONE PER 250 MG: Performed by: FAMILY MEDICINE

## 2018-01-01 PROCEDURE — 0W993ZX DRAINAGE OF RIGHT PLEURAL CAVITY, PERCUTANEOUS APPROACH, DIAGNOSTIC: ICD-10-PCS | Performed by: RADIOLOGY

## 2018-01-01 PROCEDURE — 63710000001 PREDNISONE PER 1 MG: Performed by: NURSE PRACTITIONER

## 2018-01-01 PROCEDURE — 88341 IMHCHEM/IMCYTCHM EA ADD ANTB: CPT | Performed by: INTERNAL MEDICINE

## 2018-01-01 PROCEDURE — 25010000002 LORAZEPAM PER 2 MG: Performed by: INTERNAL MEDICINE

## 2018-01-01 PROCEDURE — 82803 BLOOD GASES ANY COMBINATION: CPT

## 2018-01-01 PROCEDURE — 71045 X-RAY EXAM CHEST 1 VIEW: CPT

## 2018-01-01 PROCEDURE — 85610 PROTHROMBIN TIME: CPT | Performed by: RADIOLOGY

## 2018-01-01 PROCEDURE — 88342 IMHCHEM/IMCYTCHM 1ST ANTB: CPT | Performed by: PATHOLOGY

## 2018-01-01 PROCEDURE — 84157 ASSAY OF PROTEIN OTHER: CPT | Performed by: INTERNAL MEDICINE

## 2018-01-01 PROCEDURE — 85379 FIBRIN DEGRADATION QUANT: CPT | Performed by: FAMILY MEDICINE

## 2018-01-01 PROCEDURE — 25010000002 ENOXAPARIN PER 10 MG: Performed by: NURSE PRACTITIONER

## 2018-01-01 PROCEDURE — 99285 EMERGENCY DEPT VISIT HI MDM: CPT

## 2018-01-01 PROCEDURE — 88104 CYTOPATH FL NONGYN SMEARS: CPT | Performed by: INTERNAL MEDICINE

## 2018-01-01 PROCEDURE — 87015 SPECIMEN INFECT AGNT CONCNTJ: CPT | Performed by: INTERNAL MEDICINE

## 2018-01-01 PROCEDURE — 25010000002 METHYLPREDNISOLONE PER 125 MG: Performed by: INTERNAL MEDICINE

## 2018-01-01 PROCEDURE — 63710000001 PREDNISONE PER 1 MG: Performed by: INTERNAL MEDICINE

## 2018-01-01 PROCEDURE — 85025 COMPLETE CBC W/AUTO DIFF WBC: CPT | Performed by: EMERGENCY MEDICINE

## 2018-01-01 PROCEDURE — 88112 CYTOPATH CELL ENHANCE TECH: CPT | Performed by: PATHOLOGY

## 2018-01-01 PROCEDURE — 87798 DETECT AGENT NOS DNA AMP: CPT | Performed by: FAMILY MEDICINE

## 2018-01-01 PROCEDURE — 76942 ECHO GUIDE FOR BIOPSY: CPT

## 2018-01-01 PROCEDURE — 83615 LACTATE (LD) (LDH) ENZYME: CPT | Performed by: INTERNAL MEDICINE

## 2018-01-01 PROCEDURE — 99223 1ST HOSP IP/OBS HIGH 75: CPT | Performed by: INTERNAL MEDICINE

## 2018-01-01 PROCEDURE — 94640 AIRWAY INHALATION TREATMENT: CPT

## 2018-01-01 PROCEDURE — 88341 IMHCHEM/IMCYTCHM EA ADD ANTB: CPT | Performed by: PATHOLOGY

## 2018-01-01 PROCEDURE — 25010000002 METHYLPREDNISOLONE PER 40 MG: Performed by: INTERNAL MEDICINE

## 2018-01-01 PROCEDURE — 93306 TTE W/DOPPLER COMPLETE: CPT

## 2018-01-01 PROCEDURE — 83605 ASSAY OF LACTIC ACID: CPT | Performed by: NURSE PRACTITIONER

## 2018-01-01 PROCEDURE — 94799 UNLISTED PULMONARY SVC/PX: CPT | Performed by: NURSE PRACTITIONER

## 2018-01-01 PROCEDURE — 25010000002 CEFTRIAXONE PER 250 MG: Performed by: EMERGENCY MEDICINE

## 2018-01-01 PROCEDURE — 25010000002 ONDANSETRON PER 1 MG: Performed by: FAMILY MEDICINE

## 2018-01-01 PROCEDURE — 88112 CYTOPATH CELL ENHANCE TECH: CPT | Performed by: INTERNAL MEDICINE

## 2018-01-01 PROCEDURE — 93306 TTE W/DOPPLER COMPLETE: CPT | Performed by: INTERNAL MEDICINE

## 2018-01-01 PROCEDURE — 83880 ASSAY OF NATRIURETIC PEPTIDE: CPT | Performed by: EMERGENCY MEDICINE

## 2018-01-01 PROCEDURE — 63710000001 PREDNISONE PER 5 MG: Performed by: NURSE PRACTITIONER

## 2018-01-01 PROCEDURE — 84484 ASSAY OF TROPONIN QUANT: CPT | Performed by: EMERGENCY MEDICINE

## 2018-01-01 PROCEDURE — 87633 RESP VIRUS 12-25 TARGETS: CPT | Performed by: FAMILY MEDICINE

## 2018-01-01 PROCEDURE — 82803 BLOOD GASES ANY COMBINATION: CPT | Performed by: FAMILY MEDICINE

## 2018-01-01 PROCEDURE — 85007 BL SMEAR W/DIFF WBC COUNT: CPT | Performed by: FAMILY MEDICINE

## 2018-01-01 PROCEDURE — 93005 ELECTROCARDIOGRAM TRACING: CPT | Performed by: EMERGENCY MEDICINE

## 2018-01-01 PROCEDURE — 71046 X-RAY EXAM CHEST 2 VIEWS: CPT

## 2018-01-01 PROCEDURE — 83605 ASSAY OF LACTIC ACID: CPT | Performed by: EMERGENCY MEDICINE

## 2018-01-01 PROCEDURE — 87581 M.PNEUMON DNA AMP PROBE: CPT | Performed by: FAMILY MEDICINE

## 2018-01-01 PROCEDURE — 80053 COMPREHEN METABOLIC PANEL: CPT | Performed by: EMERGENCY MEDICINE

## 2018-01-01 RX ORDER — LORAZEPAM 2 MG/ML
0.5 INJECTION INTRAMUSCULAR EVERY 4 HOURS PRN
Status: CANCELLED | OUTPATIENT
Start: 2018-01-01 | End: 2018-09-23

## 2018-01-01 RX ORDER — ALBUTEROL SULFATE 90 UG/1
2 AEROSOL, METERED RESPIRATORY (INHALATION) EVERY 4 HOURS PRN
COMMUNITY

## 2018-01-01 RX ORDER — FAMOTIDINE 20 MG/1
20 TABLET, FILM COATED ORAL DAILY
Status: DISCONTINUED | OUTPATIENT
Start: 2018-01-01 | End: 2018-01-01 | Stop reason: HOSPADM

## 2018-01-01 RX ORDER — LORAZEPAM 2 MG/ML
1 INJECTION INTRAMUSCULAR EVERY 8 HOURS
Status: DISCONTINUED | OUTPATIENT
Start: 2018-01-01 | End: 2018-01-01 | Stop reason: HOSPADM

## 2018-01-01 RX ORDER — ALBUTEROL SULFATE 2.5 MG/3ML
2.5 SOLUTION RESPIRATORY (INHALATION) EVERY 6 HOURS PRN
Status: DISCONTINUED | OUTPATIENT
Start: 2018-01-01 | End: 2018-01-01

## 2018-01-01 RX ORDER — SODIUM CHLORIDE 0.9 % (FLUSH) 0.9 %
1-10 SYRINGE (ML) INJECTION AS NEEDED
Status: DISCONTINUED | OUTPATIENT
Start: 2018-01-01 | End: 2018-01-01 | Stop reason: HOSPADM

## 2018-01-01 RX ORDER — FUROSEMIDE 10 MG/ML
40 INJECTION INTRAMUSCULAR; INTRAVENOUS EVERY 12 HOURS
Status: DISCONTINUED | OUTPATIENT
Start: 2018-01-01 | End: 2018-01-01

## 2018-01-01 RX ORDER — IPRATROPIUM BROMIDE AND ALBUTEROL SULFATE 2.5; .5 MG/3ML; MG/3ML
3 SOLUTION RESPIRATORY (INHALATION) ONCE
Status: COMPLETED | OUTPATIENT
Start: 2018-01-01 | End: 2018-01-01

## 2018-01-01 RX ORDER — LORAZEPAM 2 MG/ML
0.5 INJECTION INTRAMUSCULAR EVERY 6 HOURS PRN
Status: DISCONTINUED | OUTPATIENT
Start: 2018-01-01 | End: 2018-01-01

## 2018-01-01 RX ORDER — MORPHINE SULFATE 1 MG/ML
INJECTION INTRAVENOUS CONTINUOUS
Status: DISCONTINUED | OUTPATIENT
Start: 2018-01-01 | End: 2018-01-01 | Stop reason: HOSPADM

## 2018-01-01 RX ORDER — PROMETHAZINE HYDROCHLORIDE 25 MG/ML
12.5 INJECTION, SOLUTION INTRAMUSCULAR; INTRAVENOUS EVERY 6 HOURS PRN
Status: CANCELLED | OUTPATIENT
Start: 2018-01-01

## 2018-01-01 RX ORDER — LORAZEPAM 2 MG/ML
0.5 INJECTION INTRAMUSCULAR EVERY 8 HOURS
Status: DISCONTINUED | OUTPATIENT
Start: 2018-01-01 | End: 2018-01-01

## 2018-01-01 RX ORDER — SODIUM CHLORIDE 9 MG/ML
50 INJECTION, SOLUTION INTRAVENOUS CONTINUOUS
Status: DISCONTINUED | OUTPATIENT
Start: 2018-01-01 | End: 2018-01-01

## 2018-01-01 RX ORDER — MORPHINE SULFATE 2 MG/ML
2 INJECTION, SOLUTION INTRAMUSCULAR; INTRAVENOUS EVERY 4 HOURS PRN
Status: DISCONTINUED | OUTPATIENT
Start: 2018-01-01 | End: 2018-01-01

## 2018-01-01 RX ORDER — LORAZEPAM 2 MG/ML
0.5 CONCENTRATE ORAL EVERY 8 HOURS
Status: DISCONTINUED | OUTPATIENT
Start: 2018-01-01 | End: 2018-01-01

## 2018-01-01 RX ORDER — ALBUTEROL SULFATE 2.5 MG/3ML
2.5 SOLUTION RESPIRATORY (INHALATION) EVERY 4 HOURS PRN
Status: CANCELLED | OUTPATIENT
Start: 2018-01-01

## 2018-01-01 RX ORDER — ACETAMINOPHEN 325 MG/1
650 TABLET ORAL EVERY 4 HOURS PRN
Status: CANCELLED | OUTPATIENT
Start: 2018-01-01

## 2018-01-01 RX ORDER — METHYLPREDNISOLONE SODIUM SUCCINATE 40 MG/ML
40 INJECTION, POWDER, LYOPHILIZED, FOR SOLUTION INTRAMUSCULAR; INTRAVENOUS EVERY 8 HOURS
Status: DISCONTINUED | OUTPATIENT
Start: 2018-01-01 | End: 2018-01-01

## 2018-01-01 RX ORDER — SODIUM CHLORIDE 9 MG/ML
INJECTION, SOLUTION INTRAVENOUS
Status: DISCONTINUED
Start: 2018-01-01 | End: 2018-01-01 | Stop reason: HOSPADM

## 2018-01-01 RX ORDER — ONDANSETRON 2 MG/ML
4 INJECTION INTRAMUSCULAR; INTRAVENOUS EVERY 6 HOURS PRN
Status: DISCONTINUED | OUTPATIENT
Start: 2018-01-01 | End: 2018-01-01 | Stop reason: HOSPADM

## 2018-01-01 RX ORDER — ONDANSETRON 2 MG/ML
4 INJECTION INTRAMUSCULAR; INTRAVENOUS EVERY 6 HOURS PRN
Status: CANCELLED | OUTPATIENT
Start: 2018-01-01

## 2018-01-01 RX ORDER — FUROSEMIDE 10 MG/ML
20 INJECTION INTRAMUSCULAR; INTRAVENOUS EVERY 12 HOURS SCHEDULED
Status: DISCONTINUED | OUTPATIENT
Start: 2018-01-01 | End: 2018-01-01

## 2018-01-01 RX ORDER — METHYLPREDNISOLONE SODIUM SUCCINATE 125 MG/2ML
125 INJECTION, POWDER, LYOPHILIZED, FOR SOLUTION INTRAMUSCULAR; INTRAVENOUS ONCE
Status: COMPLETED | OUTPATIENT
Start: 2018-01-01 | End: 2018-01-01

## 2018-01-01 RX ORDER — TRAZODONE HYDROCHLORIDE 50 MG/1
25 TABLET ORAL NIGHTLY PRN
Status: DISCONTINUED | OUTPATIENT
Start: 2018-01-01 | End: 2018-01-01 | Stop reason: HOSPADM

## 2018-01-01 RX ORDER — MORPHINE SULFATE 2 MG/ML
2 INJECTION, SOLUTION INTRAMUSCULAR; INTRAVENOUS
Status: DISCONTINUED | OUTPATIENT
Start: 2018-01-01 | End: 2018-01-01 | Stop reason: HOSPADM

## 2018-01-01 RX ORDER — FUROSEMIDE 10 MG/ML
20 INJECTION INTRAMUSCULAR; INTRAVENOUS ONCE
Status: COMPLETED | OUTPATIENT
Start: 2018-01-01 | End: 2018-01-01

## 2018-01-01 RX ORDER — IPRATROPIUM BROMIDE AND ALBUTEROL SULFATE 2.5; .5 MG/3ML; MG/3ML
3 SOLUTION RESPIRATORY (INHALATION)
Status: DISCONTINUED | OUTPATIENT
Start: 2018-01-01 | End: 2018-01-01 | Stop reason: ALTCHOICE

## 2018-01-01 RX ORDER — PROMETHAZINE HYDROCHLORIDE 25 MG/ML
12.5 INJECTION, SOLUTION INTRAMUSCULAR; INTRAVENOUS EVERY 6 HOURS PRN
Status: DISCONTINUED | OUTPATIENT
Start: 2018-01-01 | End: 2018-01-01 | Stop reason: HOSPADM

## 2018-01-01 RX ORDER — FUROSEMIDE 10 MG/ML
40 INJECTION INTRAMUSCULAR; INTRAVENOUS ONCE
Status: COMPLETED | OUTPATIENT
Start: 2018-01-01 | End: 2018-01-01

## 2018-01-01 RX ORDER — MORPHINE SULFATE 2 MG/ML
1 INJECTION, SOLUTION INTRAMUSCULAR; INTRAVENOUS
Status: DISCONTINUED | OUTPATIENT
Start: 2018-01-01 | End: 2018-01-01

## 2018-01-01 RX ORDER — BUDESONIDE AND FORMOTEROL FUMARATE DIHYDRATE 160; 4.5 UG/1; UG/1
2 AEROSOL RESPIRATORY (INHALATION)
Status: DISCONTINUED | OUTPATIENT
Start: 2018-01-01 | End: 2018-01-01

## 2018-01-01 RX ORDER — SODIUM CHLORIDE 0.9 % (FLUSH) 0.9 %
1-10 SYRINGE (ML) INJECTION AS NEEDED
Status: CANCELLED | OUTPATIENT
Start: 2018-01-01

## 2018-01-01 RX ORDER — DIPHENHYDRAMINE HYDROCHLORIDE 50 MG/ML
50 INJECTION INTRAMUSCULAR; INTRAVENOUS ONCE
Status: COMPLETED | OUTPATIENT
Start: 2018-01-01 | End: 2018-01-01

## 2018-01-01 RX ORDER — TAMSULOSIN HYDROCHLORIDE 0.4 MG/1
1 CAPSULE ORAL NIGHTLY
COMMUNITY

## 2018-01-01 RX ORDER — MORPHINE SULFATE 20 MG/ML
5 SOLUTION ORAL EVERY 8 HOURS
Status: DISCONTINUED | OUTPATIENT
Start: 2018-01-01 | End: 2018-01-01

## 2018-01-01 RX ORDER — METHYLPREDNISOLONE SODIUM SUCCINATE 125 MG/2ML
60 INJECTION, POWDER, LYOPHILIZED, FOR SOLUTION INTRAMUSCULAR; INTRAVENOUS EVERY 8 HOURS
Status: DISCONTINUED | OUTPATIENT
Start: 2018-01-01 | End: 2018-01-01

## 2018-01-01 RX ORDER — SODIUM CHLORIDE 0.9 % (FLUSH) 0.9 %
10 SYRINGE (ML) INJECTION AS NEEDED
Status: DISCONTINUED | OUTPATIENT
Start: 2018-01-01 | End: 2018-01-01 | Stop reason: HOSPADM

## 2018-01-01 RX ORDER — MORPHINE SULFATE 2 MG/ML
2 INJECTION, SOLUTION INTRAMUSCULAR; INTRAVENOUS
Status: DISCONTINUED | OUTPATIENT
Start: 2018-01-01 | End: 2018-01-01

## 2018-01-01 RX ORDER — TAMSULOSIN HYDROCHLORIDE 0.4 MG/1
0.4 CAPSULE ORAL NIGHTLY
Status: CANCELLED | OUTPATIENT
Start: 2018-01-01

## 2018-01-01 RX ORDER — TAMSULOSIN HYDROCHLORIDE 0.4 MG/1
0.4 CAPSULE ORAL NIGHTLY
Status: DISCONTINUED | OUTPATIENT
Start: 2018-01-01 | End: 2018-01-01 | Stop reason: HOSPADM

## 2018-01-01 RX ORDER — DEXTROSE MONOHYDRATE 25 G/50ML
25 INJECTION, SOLUTION INTRAVENOUS
Status: DISCONTINUED | OUTPATIENT
Start: 2018-01-01 | End: 2018-01-01

## 2018-01-01 RX ORDER — TRAZODONE HYDROCHLORIDE 50 MG/1
25 TABLET ORAL NIGHTLY PRN
Status: CANCELLED | OUTPATIENT
Start: 2018-01-01

## 2018-01-01 RX ORDER — PREDNISONE 20 MG/1
40 TABLET ORAL
Status: DISCONTINUED | OUTPATIENT
Start: 2018-01-01 | End: 2018-01-01

## 2018-01-01 RX ORDER — MORPHINE SULFATE 2 MG/ML
2 INJECTION, SOLUTION INTRAMUSCULAR; INTRAVENOUS
Status: CANCELLED | OUTPATIENT
Start: 2018-01-01 | End: 2018-09-28

## 2018-01-01 RX ORDER — ACETAMINOPHEN 325 MG/1
650 TABLET ORAL EVERY 4 HOURS PRN
Status: DISCONTINUED | OUTPATIENT
Start: 2018-01-01 | End: 2018-01-01 | Stop reason: HOSPADM

## 2018-01-01 RX ORDER — LORAZEPAM 2 MG/ML
1 INJECTION INTRAMUSCULAR EVERY 8 HOURS
Status: CANCELLED | OUTPATIENT
Start: 2018-01-01 | End: 2018-10-01

## 2018-01-01 RX ORDER — IPRATROPIUM BROMIDE AND ALBUTEROL SULFATE 2.5; .5 MG/3ML; MG/3ML
3 SOLUTION RESPIRATORY (INHALATION)
Status: DISCONTINUED | OUTPATIENT
Start: 2018-01-01 | End: 2018-01-01

## 2018-01-01 RX ORDER — NALOXONE HCL 0.4 MG/ML
0.1 VIAL (ML) INJECTION
Status: DISCONTINUED | OUTPATIENT
Start: 2018-01-01 | End: 2018-01-01 | Stop reason: HOSPADM

## 2018-01-01 RX ORDER — METHYLPREDNISOLONE SODIUM SUCCINATE 40 MG/ML
40 INJECTION, POWDER, LYOPHILIZED, FOR SOLUTION INTRAMUSCULAR; INTRAVENOUS EVERY 12 HOURS
Status: DISCONTINUED | OUTPATIENT
Start: 2018-01-01 | End: 2018-01-01

## 2018-01-01 RX ORDER — NALOXONE HCL 0.4 MG/ML
0.1 VIAL (ML) INJECTION
Status: CANCELLED | OUTPATIENT
Start: 2018-01-01

## 2018-01-01 RX ORDER — NICOTINE POLACRILEX 4 MG
15 LOZENGE BUCCAL
Status: DISCONTINUED | OUTPATIENT
Start: 2018-01-01 | End: 2018-01-01

## 2018-01-01 RX ORDER — MORPHINE SULFATE 10 MG/5ML
5 SOLUTION ORAL
Status: DISCONTINUED | OUTPATIENT
Start: 2018-01-01 | End: 2018-01-01

## 2018-01-01 RX ORDER — DOXYCYCLINE 100 MG/1
100 CAPSULE ORAL EVERY 12 HOURS SCHEDULED
Status: DISCONTINUED | OUTPATIENT
Start: 2018-01-01 | End: 2018-01-01

## 2018-01-01 RX ORDER — LORAZEPAM 2 MG/ML
0.5 INJECTION INTRAMUSCULAR EVERY 4 HOURS PRN
Status: DISCONTINUED | OUTPATIENT
Start: 2018-01-01 | End: 2018-01-01 | Stop reason: HOSPADM

## 2018-01-01 RX ORDER — FUROSEMIDE 10 MG/ML
20 INJECTION INTRAMUSCULAR; INTRAVENOUS EVERY 12 HOURS
Status: DISCONTINUED | OUTPATIENT
Start: 2018-01-01 | End: 2018-01-01

## 2018-01-01 RX ORDER — MORPHINE SULFATE 2 MG/ML
1 INJECTION, SOLUTION INTRAMUSCULAR; INTRAVENOUS EVERY 4 HOURS PRN
Status: DISCONTINUED | OUTPATIENT
Start: 2018-01-01 | End: 2018-01-01

## 2018-01-01 RX ORDER — ALBUTEROL SULFATE 2.5 MG/3ML
2.5 SOLUTION RESPIRATORY (INHALATION) EVERY 4 HOURS PRN
Status: DISCONTINUED | OUTPATIENT
Start: 2018-01-01 | End: 2018-01-01 | Stop reason: HOSPADM

## 2018-01-01 RX ORDER — SODIUM CHLORIDE 0.9 % (FLUSH) 0.9 %
10 SYRINGE (ML) INJECTION AS NEEDED
Status: CANCELLED | OUTPATIENT
Start: 2018-01-01

## 2018-01-01 RX ORDER — LORAZEPAM 2 MG/ML
0.5 CONCENTRATE ORAL EVERY 4 HOURS PRN
Status: DISCONTINUED | OUTPATIENT
Start: 2018-01-01 | End: 2018-01-01 | Stop reason: HOSPADM

## 2018-01-01 RX ORDER — MORPHINE SULFATE 1 MG/ML
INJECTION INTRAVENOUS CONTINUOUS
Status: DISCONTINUED | OUTPATIENT
Start: 2018-01-01 | End: 2018-01-01

## 2018-01-01 RX ORDER — MORPHINE SULFATE 20 MG/ML
5 SOLUTION ORAL
Status: DISCONTINUED | OUTPATIENT
Start: 2018-01-01 | End: 2018-01-01

## 2018-01-01 RX ORDER — AMLODIPINE BESYLATE 5 MG/1
5 TABLET ORAL DAILY
COMMUNITY

## 2018-01-01 RX ORDER — MORPHINE SULFATE 10 MG/5ML
5 SOLUTION ORAL EVERY 8 HOURS
Status: DISCONTINUED | OUTPATIENT
Start: 2018-01-01 | End: 2018-01-01

## 2018-01-01 RX ORDER — AMLODIPINE BESYLATE 5 MG/1
5 TABLET ORAL DAILY
Status: DISCONTINUED | OUTPATIENT
Start: 2018-01-01 | End: 2018-01-01

## 2018-01-01 RX ORDER — HYDRALAZINE HYDROCHLORIDE 20 MG/ML
10 INJECTION INTRAMUSCULAR; INTRAVENOUS EVERY 6 HOURS PRN
Status: DISCONTINUED | OUTPATIENT
Start: 2018-01-01 | End: 2018-01-01

## 2018-01-01 RX ORDER — MORPHINE SULFATE 1 MG/ML
INJECTION INTRAVENOUS CONTINUOUS
Status: CANCELLED | OUTPATIENT
Start: 2018-01-01 | End: 2018-10-05

## 2018-01-01 RX ADMIN — INSULIN ASPART 4 UNITS: 100 INJECTION, SOLUTION INTRAVENOUS; SUBCUTANEOUS at 21:48

## 2018-01-01 RX ADMIN — IPRATROPIUM BROMIDE AND ALBUTEROL SULFATE 3 ML: 2.5; .5 SOLUTION RESPIRATORY (INHALATION) at 12:33

## 2018-01-01 RX ADMIN — BUDESONIDE AND FORMOTEROL FUMARATE DIHYDRATE 2 PUFF: 160; 4.5 AEROSOL RESPIRATORY (INHALATION) at 06:56

## 2018-01-01 RX ADMIN — IPRATROPIUM BROMIDE AND ALBUTEROL SULFATE 3 ML: 2.5; .5 SOLUTION RESPIRATORY (INHALATION) at 12:44

## 2018-01-01 RX ADMIN — TAMSULOSIN HYDROCHLORIDE 0.4 MG: 0.4 CAPSULE ORAL at 21:17

## 2018-01-01 RX ADMIN — ENOXAPARIN SODIUM 80 MG: 80 INJECTION SUBCUTANEOUS at 13:06

## 2018-01-01 RX ADMIN — PREDNISONE 50 MG: 20 TABLET ORAL at 18:32

## 2018-01-01 RX ADMIN — DOXYCYCLINE 100 MG: 100 INJECTION, POWDER, LYOPHILIZED, FOR SOLUTION INTRAVENOUS at 10:20

## 2018-01-01 RX ADMIN — FUROSEMIDE 20 MG: 10 INJECTION, SOLUTION INTRAMUSCULAR; INTRAVENOUS at 09:28

## 2018-01-01 RX ADMIN — IPRATROPIUM BROMIDE AND ALBUTEROL SULFATE 3 ML: 2.5; .5 SOLUTION RESPIRATORY (INHALATION) at 19:55

## 2018-01-01 RX ADMIN — FAMOTIDINE 20 MG: 20 TABLET ORAL at 10:32

## 2018-01-01 RX ADMIN — ENOXAPARIN SODIUM 40 MG: 40 INJECTION SUBCUTANEOUS at 09:38

## 2018-01-01 RX ADMIN — FUROSEMIDE 20 MG: 10 INJECTION, SOLUTION INTRAMUSCULAR; INTRAVENOUS at 08:21

## 2018-01-01 RX ADMIN — BUDESONIDE AND FORMOTEROL FUMARATE DIHYDRATE 2 PUFF: 160; 4.5 AEROSOL RESPIRATORY (INHALATION) at 07:22

## 2018-01-01 RX ADMIN — IPRATROPIUM BROMIDE AND ALBUTEROL SULFATE 3 ML: 2.5; .5 SOLUTION RESPIRATORY (INHALATION) at 20:46

## 2018-01-01 RX ADMIN — MORPHINE SULFATE 1 MG: 2 INJECTION, SOLUTION INTRAMUSCULAR; INTRAVENOUS at 17:44

## 2018-01-01 RX ADMIN — ENOXAPARIN SODIUM 80 MG: 80 INJECTION SUBCUTANEOUS at 02:26

## 2018-01-01 RX ADMIN — BUDESONIDE AND FORMOTEROL FUMARATE DIHYDRATE 2 PUFF: 160; 4.5 AEROSOL RESPIRATORY (INHALATION) at 06:43

## 2018-01-01 RX ADMIN — Medication 25 MG: at 21:48

## 2018-01-01 RX ADMIN — SODIUM CHLORIDE 50 ML/HR: 900 INJECTION, SOLUTION INTRAVENOUS at 07:04

## 2018-01-01 RX ADMIN — ENOXAPARIN SODIUM 40 MG: 40 INJECTION SUBCUTANEOUS at 09:41

## 2018-01-01 RX ADMIN — MORPHINE SULFATE: 1 INJECTION INTRAVENOUS at 11:58

## 2018-01-01 RX ADMIN — DOXYCYCLINE 100 MG: 100 CAPSULE ORAL at 14:16

## 2018-01-01 RX ADMIN — AMLODIPINE BESYLATE 5 MG: 5 TABLET ORAL at 09:38

## 2018-01-01 RX ADMIN — FAMOTIDINE 20 MG: 20 TABLET ORAL at 11:08

## 2018-01-01 RX ADMIN — LORAZEPAM 0.5 MG: 2 INJECTION, SOLUTION INTRAMUSCULAR; INTRAVENOUS at 23:23

## 2018-01-01 RX ADMIN — ALBUTEROL SULFATE 2.5 MG: 2.5 SOLUTION RESPIRATORY (INHALATION) at 16:10

## 2018-01-01 RX ADMIN — ACETAMINOPHEN 650 MG: 325 TABLET ORAL at 05:46

## 2018-01-01 RX ADMIN — LORAZEPAM 0.5 MG: 2 SOLUTION, CONCENTRATE ORAL at 06:47

## 2018-01-01 RX ADMIN — FUROSEMIDE 20 MG: 10 INJECTION, SOLUTION INTRAMUSCULAR; INTRAVENOUS at 21:59

## 2018-01-01 RX ADMIN — IPRATROPIUM BROMIDE AND ALBUTEROL SULFATE 3 ML: 2.5; .5 SOLUTION RESPIRATORY (INHALATION) at 08:28

## 2018-01-01 RX ADMIN — ACETAMINOPHEN 650 MG: 325 TABLET ORAL at 21:58

## 2018-01-01 RX ADMIN — IPRATROPIUM BROMIDE AND ALBUTEROL SULFATE 3 ML: 2.5; .5 SOLUTION RESPIRATORY (INHALATION) at 01:00

## 2018-01-01 RX ADMIN — LORAZEPAM 0.5 MG: 2 INJECTION, SOLUTION INTRAMUSCULAR; INTRAVENOUS at 21:17

## 2018-01-01 RX ADMIN — LORAZEPAM 1 MG: 2 INJECTION, SOLUTION INTRAMUSCULAR; INTRAVENOUS at 03:57

## 2018-01-01 RX ADMIN — METHYLPREDNISOLONE SODIUM SUCCINATE 40 MG: 40 INJECTION, POWDER, FOR SOLUTION INTRAMUSCULAR; INTRAVENOUS at 04:47

## 2018-01-01 RX ADMIN — INSULIN ASPART 2 UNITS: 100 INJECTION, SOLUTION INTRAVENOUS; SUBCUTANEOUS at 20:33

## 2018-01-01 RX ADMIN — IPRATROPIUM BROMIDE AND ALBUTEROL SULFATE 3 ML: 2.5; .5 SOLUTION RESPIRATORY (INHALATION) at 11:32

## 2018-01-01 RX ADMIN — BUDESONIDE AND FORMOTEROL FUMARATE DIHYDRATE 2 PUFF: 160; 4.5 AEROSOL RESPIRATORY (INHALATION) at 08:58

## 2018-01-01 RX ADMIN — IPRATROPIUM BROMIDE AND ALBUTEROL SULFATE 3 ML: 2.5; .5 SOLUTION RESPIRATORY (INHALATION) at 16:28

## 2018-01-01 RX ADMIN — DOXYCYCLINE 100 MG: 100 INJECTION, POWDER, LYOPHILIZED, FOR SOLUTION INTRAVENOUS at 21:50

## 2018-01-01 RX ADMIN — TAMSULOSIN HYDROCHLORIDE 0.4 MG: 0.4 CAPSULE ORAL at 21:48

## 2018-01-01 RX ADMIN — IPRATROPIUM BROMIDE AND ALBUTEROL SULFATE 3 ML: 2.5; .5 SOLUTION RESPIRATORY (INHALATION) at 21:13

## 2018-01-01 RX ADMIN — AMLODIPINE BESYLATE 5 MG: 5 TABLET ORAL at 09:32

## 2018-01-01 RX ADMIN — DOXYCYCLINE 100 MG: 100 INJECTION, POWDER, LYOPHILIZED, FOR SOLUTION INTRAVENOUS at 07:06

## 2018-01-01 RX ADMIN — BUDESONIDE AND FORMOTEROL FUMARATE DIHYDRATE 2 PUFF: 160; 4.5 AEROSOL RESPIRATORY (INHALATION) at 08:28

## 2018-01-01 RX ADMIN — PROMETHAZINE HYDROCHLORIDE 12.5 MG: 25 INJECTION INTRAMUSCULAR; INTRAVENOUS at 20:05

## 2018-01-01 RX ADMIN — Medication 10 ML: at 17:44

## 2018-01-01 RX ADMIN — Medication 10 ML: at 17:00

## 2018-01-01 RX ADMIN — PROMETHAZINE HYDROCHLORIDE 12.5 MG: 25 INJECTION INTRAMUSCULAR; INTRAVENOUS at 05:15

## 2018-01-01 RX ADMIN — ENOXAPARIN SODIUM 40 MG: 40 INJECTION SUBCUTANEOUS at 08:50

## 2018-01-01 RX ADMIN — LORAZEPAM 0.5 MG: 2 SOLUTION, CONCENTRATE ORAL at 16:03

## 2018-01-01 RX ADMIN — FUROSEMIDE 40 MG: 10 INJECTION, SOLUTION INTRAMUSCULAR; INTRAVENOUS at 20:36

## 2018-01-01 RX ADMIN — BUDESONIDE AND FORMOTEROL FUMARATE DIHYDRATE 2 PUFF: 160; 4.5 AEROSOL RESPIRATORY (INHALATION) at 19:35

## 2018-01-01 RX ADMIN — FUROSEMIDE 20 MG: 10 INJECTION, SOLUTION INTRAMUSCULAR; INTRAVENOUS at 06:12

## 2018-01-01 RX ADMIN — MORPHINE SULFATE 2 MG: 2 INJECTION, SOLUTION INTRAMUSCULAR; INTRAVENOUS at 21:17

## 2018-01-01 RX ADMIN — PROMETHAZINE HYDROCHLORIDE 12.5 MG: 25 INJECTION INTRAMUSCULAR; INTRAVENOUS at 20:36

## 2018-01-01 RX ADMIN — Medication 10 ML: at 13:46

## 2018-01-01 RX ADMIN — IPRATROPIUM BROMIDE AND ALBUTEROL SULFATE 3 ML: 2.5; .5 SOLUTION RESPIRATORY (INHALATION) at 16:46

## 2018-01-01 RX ADMIN — IPRATROPIUM BROMIDE AND ALBUTEROL SULFATE 3 ML: 2.5; .5 SOLUTION RESPIRATORY (INHALATION) at 00:35

## 2018-01-01 RX ADMIN — LORAZEPAM 1 MG: 2 INJECTION, SOLUTION INTRAMUSCULAR; INTRAVENOUS at 20:13

## 2018-01-01 RX ADMIN — AMLODIPINE BESYLATE 5 MG: 5 TABLET ORAL at 08:21

## 2018-01-01 RX ADMIN — Medication 10 ML: at 20:13

## 2018-01-01 RX ADMIN — IPRATROPIUM BROMIDE AND ALBUTEROL SULFATE 3 ML: 2.5; .5 SOLUTION RESPIRATORY (INHALATION) at 12:12

## 2018-01-01 RX ADMIN — FUROSEMIDE 20 MG: 10 INJECTION, SOLUTION INTRAMUSCULAR; INTRAVENOUS at 20:05

## 2018-01-01 RX ADMIN — CEFTRIAXONE SODIUM 1 G: 1 INJECTION, POWDER, FOR SOLUTION INTRAMUSCULAR; INTRAVENOUS at 06:15

## 2018-01-01 RX ADMIN — ONDANSETRON 4 MG: 2 INJECTION INTRAMUSCULAR; INTRAVENOUS at 17:57

## 2018-01-01 RX ADMIN — PREDNISONE 50 MG: 20 TABLET ORAL at 23:13

## 2018-01-01 RX ADMIN — BUDESONIDE AND FORMOTEROL FUMARATE DIHYDRATE 2 PUFF: 160; 4.5 AEROSOL RESPIRATORY (INHALATION) at 21:13

## 2018-01-01 RX ADMIN — INSULIN ASPART 2 UNITS: 100 INJECTION, SOLUTION INTRAVENOUS; SUBCUTANEOUS at 18:04

## 2018-01-01 RX ADMIN — IPRATROPIUM BROMIDE AND ALBUTEROL SULFATE 3 ML: 2.5; .5 SOLUTION RESPIRATORY (INHALATION) at 08:02

## 2018-01-01 RX ADMIN — CEFTRIAXONE SODIUM 1 G: 1 INJECTION, POWDER, FOR SOLUTION INTRAMUSCULAR; INTRAVENOUS at 05:44

## 2018-01-01 RX ADMIN — IPRATROPIUM BROMIDE AND ALBUTEROL SULFATE 3 ML: 2.5; .5 SOLUTION RESPIRATORY (INHALATION) at 19:22

## 2018-01-01 RX ADMIN — FUROSEMIDE 40 MG: 10 INJECTION, SOLUTION INTRAMUSCULAR; INTRAVENOUS at 23:12

## 2018-01-01 RX ADMIN — FUROSEMIDE 20 MG: 10 INJECTION, SOLUTION INTRAMUSCULAR; INTRAVENOUS at 08:11

## 2018-01-01 RX ADMIN — ACETAMINOPHEN 650 MG: 325 TABLET ORAL at 09:37

## 2018-01-01 RX ADMIN — METHYLPREDNISOLONE SODIUM SUCCINATE 60 MG: 125 INJECTION, POWDER, FOR SOLUTION INTRAMUSCULAR; INTRAVENOUS at 05:15

## 2018-01-01 RX ADMIN — INSULIN ASPART 3 UNITS: 100 INJECTION, SOLUTION INTRAVENOUS; SUBCUTANEOUS at 16:50

## 2018-01-01 RX ADMIN — IPRATROPIUM BROMIDE AND ALBUTEROL SULFATE 3 ML: 2.5; .5 SOLUTION RESPIRATORY (INHALATION) at 07:13

## 2018-01-01 RX ADMIN — TAMSULOSIN HYDROCHLORIDE 0.4 MG: 0.4 CAPSULE ORAL at 21:59

## 2018-01-01 RX ADMIN — AMLODIPINE BESYLATE 5 MG: 5 TABLET ORAL at 08:50

## 2018-01-01 RX ADMIN — CEFTRIAXONE SODIUM 1 G: 1 INJECTION, POWDER, FOR SOLUTION INTRAMUSCULAR; INTRAVENOUS at 03:07

## 2018-01-01 RX ADMIN — IPRATROPIUM BROMIDE AND ALBUTEROL SULFATE 3 ML: 2.5; .5 SOLUTION RESPIRATORY (INHALATION) at 07:11

## 2018-01-01 RX ADMIN — METHYLPREDNISOLONE SODIUM SUCCINATE 40 MG: 40 INJECTION, POWDER, FOR SOLUTION INTRAMUSCULAR; INTRAVENOUS at 05:44

## 2018-01-01 RX ADMIN — INSULIN ASPART 3 UNITS: 100 INJECTION, SOLUTION INTRAVENOUS; SUBCUTANEOUS at 12:08

## 2018-01-01 RX ADMIN — FAMOTIDINE 20 MG: 20 TABLET ORAL at 08:11

## 2018-01-01 RX ADMIN — BUDESONIDE AND FORMOTEROL FUMARATE DIHYDRATE 2 PUFF: 160; 4.5 AEROSOL RESPIRATORY (INHALATION) at 20:46

## 2018-01-01 RX ADMIN — TAMSULOSIN HYDROCHLORIDE 0.4 MG: 0.4 CAPSULE ORAL at 20:16

## 2018-01-01 RX ADMIN — TAMSULOSIN HYDROCHLORIDE 0.4 MG: 0.4 CAPSULE ORAL at 21:40

## 2018-01-01 RX ADMIN — MORPHINE SULFATE 1 MG: 2 INJECTION, SOLUTION INTRAMUSCULAR; INTRAVENOUS at 13:26

## 2018-01-01 RX ADMIN — ENOXAPARIN SODIUM 40 MG: 40 INJECTION SUBCUTANEOUS at 11:07

## 2018-01-01 RX ADMIN — FAMOTIDINE 20 MG: 20 TABLET ORAL at 09:32

## 2018-01-01 RX ADMIN — MORPHINE SULFATE 2 MG: 2 INJECTION, SOLUTION INTRAMUSCULAR; INTRAVENOUS at 21:23

## 2018-01-01 RX ADMIN — FUROSEMIDE 40 MG: 10 INJECTION, SOLUTION INTRAMUSCULAR; INTRAVENOUS at 20:04

## 2018-01-01 RX ADMIN — PREDNISONE 40 MG: 20 TABLET ORAL at 08:11

## 2018-01-01 RX ADMIN — LORAZEPAM 0.5 MG: 2 SOLUTION, CONCENTRATE ORAL at 23:03

## 2018-01-01 RX ADMIN — FAMOTIDINE 20 MG: 20 TABLET ORAL at 08:51

## 2018-01-01 RX ADMIN — LORAZEPAM 0.5 MG: 2 INJECTION, SOLUTION INTRAMUSCULAR; INTRAVENOUS at 12:08

## 2018-01-01 RX ADMIN — Medication 25 MG: at 20:30

## 2018-01-01 RX ADMIN — IPRATROPIUM BROMIDE AND ALBUTEROL SULFATE 3 ML: 2.5; .5 SOLUTION RESPIRATORY (INHALATION) at 18:21

## 2018-01-01 RX ADMIN — FAMOTIDINE 20 MG: 20 TABLET ORAL at 09:38

## 2018-01-01 RX ADMIN — Medication 10 ML: at 04:47

## 2018-01-01 RX ADMIN — BUDESONIDE AND FORMOTEROL FUMARATE DIHYDRATE 2 PUFF: 160; 4.5 AEROSOL RESPIRATORY (INHALATION) at 07:26

## 2018-01-01 RX ADMIN — MORPHINE SULFATE 5 MG: 10 SOLUTION ORAL at 06:46

## 2018-01-01 RX ADMIN — ACETAMINOPHEN 650 MG: 325 TABLET ORAL at 19:35

## 2018-01-01 RX ADMIN — METHYLPREDNISOLONE SODIUM SUCCINATE 40 MG: 40 INJECTION, POWDER, FOR SOLUTION INTRAMUSCULAR; INTRAVENOUS at 20:36

## 2018-01-01 RX ADMIN — DIPHENHYDRAMINE HYDROCHLORIDE 50 MG: 50 INJECTION, SOLUTION INTRAMUSCULAR; INTRAVENOUS at 23:40

## 2018-01-01 RX ADMIN — ONDANSETRON 4 MG: 2 INJECTION INTRAMUSCULAR; INTRAVENOUS at 05:58

## 2018-01-01 RX ADMIN — IPRATROPIUM BROMIDE AND ALBUTEROL SULFATE 3 ML: 2.5; .5 SOLUTION RESPIRATORY (INHALATION) at 07:05

## 2018-01-01 RX ADMIN — Medication 10 ML: at 20:37

## 2018-01-01 RX ADMIN — PREDNISONE 40 MG: 20 TABLET ORAL at 09:32

## 2018-01-01 RX ADMIN — IPRATROPIUM BROMIDE AND ALBUTEROL SULFATE 3 ML: 2.5; .5 SOLUTION RESPIRATORY (INHALATION) at 16:13

## 2018-01-01 RX ADMIN — AMLODIPINE BESYLATE 5 MG: 5 TABLET ORAL at 08:39

## 2018-01-01 RX ADMIN — TAMSULOSIN HYDROCHLORIDE 0.4 MG: 0.4 CAPSULE ORAL at 20:30

## 2018-01-01 RX ADMIN — IOPAMIDOL 60 ML: 755 INJECTION, SOLUTION INTRAVENOUS at 03:15

## 2018-01-01 RX ADMIN — MORPHINE SULFATE 2 MG: 2 INJECTION, SOLUTION INTRAMUSCULAR; INTRAVENOUS at 23:07

## 2018-01-01 RX ADMIN — FUROSEMIDE 20 MG: 10 INJECTION, SOLUTION INTRAMUSCULAR; INTRAVENOUS at 09:38

## 2018-01-01 RX ADMIN — ENOXAPARIN SODIUM 40 MG: 40 INJECTION SUBCUTANEOUS at 09:32

## 2018-01-01 RX ADMIN — MORPHINE SULFATE 2 MG: 2 INJECTION, SOLUTION INTRAMUSCULAR; INTRAVENOUS at 23:37

## 2018-01-01 RX ADMIN — LORAZEPAM 0.5 MG: 2 INJECTION, SOLUTION INTRAMUSCULAR; INTRAVENOUS at 15:26

## 2018-01-01 RX ADMIN — MORPHINE SULFATE 2 MG: 2 INJECTION, SOLUTION INTRAMUSCULAR; INTRAVENOUS at 03:57

## 2018-01-01 RX ADMIN — MORPHINE SULFATE 5 MG: 10 SOLUTION ORAL at 16:03

## 2018-01-01 RX ADMIN — PREDNISONE 40 MG: 20 TABLET ORAL at 08:50

## 2018-01-01 RX ADMIN — MORPHINE SULFATE 5 MG: 10 SOLUTION ORAL at 23:02

## 2018-01-01 RX ADMIN — ACETAMINOPHEN 650 MG: 325 TABLET ORAL at 21:09

## 2018-01-01 RX ADMIN — TAMSULOSIN HYDROCHLORIDE 0.4 MG: 0.4 CAPSULE ORAL at 20:13

## 2018-01-01 RX ADMIN — INSULIN ASPART 2 UNITS: 100 INJECTION, SOLUTION INTRAVENOUS; SUBCUTANEOUS at 07:05

## 2018-01-01 RX ADMIN — TAMSULOSIN HYDROCHLORIDE 0.4 MG: 0.4 CAPSULE ORAL at 23:13

## 2018-01-01 RX ADMIN — IPRATROPIUM BROMIDE AND ALBUTEROL SULFATE 3 ML: 2.5; .5 SOLUTION RESPIRATORY (INHALATION) at 06:55

## 2018-01-01 RX ADMIN — CEFTRIAXONE SODIUM 1 G: 1 INJECTION, POWDER, FOR SOLUTION INTRAMUSCULAR; INTRAVENOUS at 05:50

## 2018-01-01 RX ADMIN — TAMSULOSIN HYDROCHLORIDE 0.4 MG: 0.4 CAPSULE ORAL at 20:04

## 2018-01-01 RX ADMIN — INSULIN ASPART 2 UNITS: 100 INJECTION, SOLUTION INTRAVENOUS; SUBCUTANEOUS at 11:25

## 2018-01-01 RX ADMIN — IPRATROPIUM BROMIDE AND ALBUTEROL SULFATE 3 ML: 2.5; .5 SOLUTION RESPIRATORY (INHALATION) at 19:20

## 2018-01-01 RX ADMIN — FUROSEMIDE 20 MG: 10 INJECTION, SOLUTION INTRAMUSCULAR; INTRAVENOUS at 05:44

## 2018-01-01 RX ADMIN — TAMSULOSIN HYDROCHLORIDE 0.4 MG: 0.4 CAPSULE ORAL at 21:09

## 2018-01-01 RX ADMIN — CEFTRIAXONE SODIUM 1 G: 1 INJECTION, POWDER, FOR SOLUTION INTRAMUSCULAR; INTRAVENOUS at 06:36

## 2018-01-01 RX ADMIN — PREDNISONE 50 MG: 20 TABLET ORAL at 12:24

## 2018-01-01 RX ADMIN — IPRATROPIUM BROMIDE AND ALBUTEROL SULFATE 3 ML: 2.5; .5 SOLUTION RESPIRATORY (INHALATION) at 06:43

## 2018-01-01 RX ADMIN — IPRATROPIUM BROMIDE AND ALBUTEROL SULFATE 3 ML: 2.5; .5 SOLUTION RESPIRATORY (INHALATION) at 07:44

## 2018-01-01 RX ADMIN — IPRATROPIUM BROMIDE AND ALBUTEROL SULFATE 3 ML: 2.5; .5 SOLUTION RESPIRATORY (INHALATION) at 08:58

## 2018-01-01 RX ADMIN — Medication 10 ML: at 13:26

## 2018-01-01 RX ADMIN — METHYLPREDNISOLONE SODIUM SUCCINATE 60 MG: 125 INJECTION, POWDER, FOR SOLUTION INTRAMUSCULAR; INTRAVENOUS at 20:04

## 2018-01-01 RX ADMIN — CEFTRIAXONE SODIUM 1 G: 1 INJECTION, POWDER, FOR SOLUTION INTRAMUSCULAR; INTRAVENOUS at 06:22

## 2018-01-01 RX ADMIN — FAMOTIDINE 20 MG: 20 TABLET ORAL at 08:39

## 2018-01-01 RX ADMIN — IPRATROPIUM BROMIDE AND ALBUTEROL SULFATE 3 ML: 2.5; .5 SOLUTION RESPIRATORY (INHALATION) at 11:40

## 2018-01-01 RX ADMIN — IPRATROPIUM BROMIDE AND ALBUTEROL SULFATE 3 ML: 2.5; .5 SOLUTION RESPIRATORY (INHALATION) at 16:00

## 2018-01-01 RX ADMIN — ENOXAPARIN SODIUM 40 MG: 40 INJECTION SUBCUTANEOUS at 08:39

## 2018-01-01 RX ADMIN — MORPHINE SULFATE 2 MG: 2 INJECTION, SOLUTION INTRAMUSCULAR; INTRAVENOUS at 00:32

## 2018-01-01 RX ADMIN — IPRATROPIUM BROMIDE AND ALBUTEROL SULFATE 3 ML: 2.5; .5 SOLUTION RESPIRATORY (INHALATION) at 20:29

## 2018-01-01 RX ADMIN — IPRATROPIUM BROMIDE AND ALBUTEROL SULFATE 3 ML: 2.5; .5 SOLUTION RESPIRATORY (INHALATION) at 00:33

## 2018-01-01 RX ADMIN — METHYLPREDNISOLONE SODIUM SUCCINATE 40 MG: 40 INJECTION, POWDER, FOR SOLUTION INTRAMUSCULAR; INTRAVENOUS at 13:46

## 2018-01-01 RX ADMIN — AMLODIPINE BESYLATE 5 MG: 5 TABLET ORAL at 08:29

## 2018-01-01 RX ADMIN — METHYLPREDNISOLONE SODIUM SUCCINATE 125 MG: 125 INJECTION, POWDER, FOR SOLUTION INTRAMUSCULAR; INTRAVENOUS at 00:48

## 2018-01-01 RX ADMIN — FUROSEMIDE 20 MG: 10 INJECTION, SOLUTION INTRAMUSCULAR; INTRAVENOUS at 18:18

## 2018-01-01 RX ADMIN — LORAZEPAM 1 MG: 2 INJECTION, SOLUTION INTRAMUSCULAR; INTRAVENOUS at 11:58

## 2018-01-01 RX ADMIN — DOXYCYCLINE 100 MG: 100 INJECTION, POWDER, LYOPHILIZED, FOR SOLUTION INTRAVENOUS at 22:33

## 2018-01-01 RX ADMIN — BUDESONIDE AND FORMOTEROL FUMARATE DIHYDRATE 2 PUFF: 160; 4.5 AEROSOL RESPIRATORY (INHALATION) at 19:23

## 2018-01-01 RX ADMIN — FUROSEMIDE 20 MG: 10 INJECTION, SOLUTION INTRAMUSCULAR; INTRAVENOUS at 20:16

## 2018-01-01 RX ADMIN — Medication 25 MG: at 22:16

## 2018-01-01 RX ADMIN — IPRATROPIUM BROMIDE AND ALBUTEROL SULFATE 3 ML: 2.5; .5 SOLUTION RESPIRATORY (INHALATION) at 07:25

## 2018-01-01 RX ADMIN — IPRATROPIUM BROMIDE AND ALBUTEROL SULFATE 3 ML: 2.5; .5 SOLUTION RESPIRATORY (INHALATION) at 15:49

## 2018-01-01 RX ADMIN — METHYLPREDNISOLONE SODIUM SUCCINATE 40 MG: 40 INJECTION, POWDER, FOR SOLUTION INTRAMUSCULAR; INTRAVENOUS at 16:50

## 2018-09-12 PROBLEM — N17.9 AKI (ACUTE KIDNEY INJURY) (HCC): Status: ACTIVE | Noted: 2018-01-01

## 2018-09-12 PROBLEM — J18.9 PNEUMONIA OF RIGHT UPPER LOBE DUE TO INFECTIOUS ORGANISM: Status: ACTIVE | Noted: 2018-01-01

## 2018-09-12 PROBLEM — N40.0 BPH (BENIGN PROSTATIC HYPERPLASIA): Chronic | Status: ACTIVE | Noted: 2018-01-01

## 2018-09-12 PROBLEM — A41.9 SEPSIS (HCC): Status: ACTIVE | Noted: 2018-01-01

## 2018-09-12 PROBLEM — I10 HYPERTENSION: Chronic | Status: ACTIVE | Noted: 2018-01-01

## 2018-09-12 NOTE — PLAN OF CARE
Problem: Patient Care Overview  Goal: Plan of Care Review  Outcome: Ongoing (interventions implemented as appropriate)   09/12/18 0446   Coping/Psychosocial   Plan of Care Reviewed With patient   Plan of Care Review   Progress no change     Goal: Individualization and Mutuality   09/12/18 0446   Individualization   Patient Specific Preferences Wants to be called Pan

## 2018-09-12 NOTE — PLAN OF CARE
Problem: Patient Care Overview  Goal: Plan of Care Review  Outcome: Ongoing (interventions implemented as appropriate)    Goal: Individualization and Mutuality  Outcome: Ongoing (interventions implemented as appropriate)    Goal: Discharge Needs Assessment  Outcome: Ongoing (interventions implemented as appropriate)      Problem: Sepsis/Septic Shock (Adult)  Goal: Signs and Symptoms of Listed Potential Problems Will be Absent, Minimized or Managed (Sepsis/Septic Shock)  Outcome: Ongoing (interventions implemented as appropriate)      Problem: Breathing Pattern Ineffective (Adult)  Goal: Identify Related Risk Factors and Signs and Symptoms  Outcome: Outcome(s) achieved Date Met: 09/12/18    Goal: Effective Oxygenation/Ventilation  Outcome: Ongoing (interventions implemented as appropriate)    Goal: Anxiety/Fear Reduction  Outcome: Ongoing (interventions implemented as appropriate)      Problem: Pneumonia (Adult)  Goal: Signs and Symptoms of Listed Potential Problems Will be Absent, Minimized or Managed (Pneumonia)  Outcome: Ongoing (interventions implemented as appropriate)

## 2018-09-12 NOTE — ED PROVIDER NOTES
Subjective   83-year-old male presents to emergency department with complaint of shortness of breath.  He has known lung disease is having a productive cough.  Shortness of breath today with significant worsening over the last 3 hours.  Denies fever or chills.  Denies vomiting or diarrhea.  Denies chest pain but states that this chest discomfort tight.  Patient was also found to be hypoxic to 89% upon arrival in the emergency department placed on oxygen.  He is not on oxygen at home.    Family history, surgical history, social history, current medications and allergies are reviewed with the patient and triage documentation and vitals are reviewed.          History provided by:  Patient and relative   used: No        Review of Systems   Constitutional: Negative for chills and fever.   HENT: Negative.    Eyes: Negative.    Respiratory: Positive for cough, chest tightness, shortness of breath and wheezing.    Cardiovascular: Negative for chest pain and palpitations.   Gastrointestinal: Negative for abdominal distention, abdominal pain, diarrhea, nausea and vomiting.   Endocrine: Negative.    Genitourinary: Negative for dysuria, frequency, hematuria and urgency.   Musculoskeletal: Negative for arthralgias, back pain, joint swelling and neck pain.   Skin: Negative for color change and wound.   Allergic/Immunologic: Negative.    Neurological: Negative.    Hematological: Negative.    Psychiatric/Behavioral: Negative.        Past Medical History:   Diagnosis Date   • Hypertension        Allergies   Allergen Reactions   • Iodine Hives       Past Surgical History:   Procedure Laterality Date   • HERNIA REPAIR         History reviewed. No pertinent family history.    Social History     Social History   • Marital status:      Social History Main Topics   • Smoking status: Current Some Day Smoker      Comment: pt quit 40 years ago   • Alcohol use Yes      Comment: occasionaly   • Drug use: Unknown   •  Sexual activity: Defer     Other Topics Concern   • Not on file           Objective   Physical Exam   Constitutional: He is oriented to person, place, and time. He appears well-developed and well-nourished. No distress.   HENT:   Head: Normocephalic.   Mouth/Throat: Oropharynx is clear and moist.   Eyes: Pupils are equal, round, and reactive to light.   Neck: Normal range of motion.   Cardiovascular: Regular rhythm.   No extrasystoles are present. Tachycardia present.  PMI is not displaced.    No murmur heard.  Pulses:       Radial pulses are 2+ on the right side, and 2+ on the left side.        Dorsalis pedis pulses are 2+ on the right side, and 2+ on the left side.   Pulmonary/Chest: No tachypnea. No respiratory distress. He has decreased breath sounds in the right upper field, the right lower field, the left upper field and the left lower field. He has wheezes in the right upper field and the left upper field. He has no rhonchi. He has no rales.   Abdominal: Soft. Bowel sounds are normal. He exhibits no distension. There is no tenderness.   Musculoskeletal: Normal range of motion. He exhibits no edema.   Neurological: He is alert and oriented to person, place, and time.   Skin: Skin is warm and dry. Capillary refill takes less than 2 seconds. He is not diaphoretic.   Psychiatric: He has a normal mood and affect.   Nursing note and vitals reviewed.      Procedures  None         ED Course      Labs Reviewed   COMPREHENSIVE METABOLIC PANEL - Abnormal; Notable for the following:        Result Value    Glucose 142 (*)     BUN 29 (*)     Creatinine 1.31 (*)     Globulin 3.9 (*)     A/G Ratio 0.9 (*)     All other components within normal limits    Narrative:     The MDRD GFR formula is only valid for adults with stable renal function between ages 18 and 70.   CBC WITH AUTO DIFFERENTIAL - Abnormal; Notable for the following:     WBC 13.70 (*)     RDW-SD 48.8 (*)     Neutrophils, Absolute 10.43 (*)     Monocytes,  Absolute 1.24 (*)     Immature Grans, Absolute 0.04 (*)     All other components within normal limits   LACTIC ACID, PLASMA - Abnormal; Notable for the following:     Lactate 2.5 (*)     All other components within normal limits   BASIC METABOLIC PANEL - Abnormal; Notable for the following:     Glucose 178 (*)     BUN 24 (*)     All other components within normal limits    Narrative:     The MDRD GFR formula is only valid for adults with stable renal function between ages 18 and 70.   D-DIMER, QUANTITATIVE - Abnormal; Notable for the following:     D-Dimer, Quantitative 1,613 (*)     All other components within normal limits    Narrative:     Dimer values <500 ng/ml FEU are FDA approved as aid in diagnosis of deep venous thrombosis and pulmonary embolism.  This test should not be used in an exclusion strategy with pretest probability alone.    A recent guideline regarding diagnosis for pulmonary thromboembolism recommends an adjusted exclusion criterion of age x 10 ng/ml FEU for patients >50 years of age ([employer] 2015; 163: 701-711).   LACTIC ACID, REFLEX - Abnormal; Notable for the following:     Lactate 3.2 (*)     All other components within normal limits   LACTIC ACID, PLASMA - Abnormal; Notable for the following:     Lactate 5.3 (*)     All other components within normal limits   LACTIC ACID, PLASMA - Abnormal; Notable for the following:     Lactate 5.4 (*)     All other components within normal limits   BLOOD GAS, ARTERIAL - Abnormal; Notable for the following:     pCO2, Arterial 32.9 (*)     pO2, Arterial 70.1 (*)     Base Excess, Arterial -2.9 (*)     O2 Saturation, Arterial 93.7 (*)     All other components within normal limits   BLOOD GAS, ARTERIAL - Abnormal; Notable for the following:     pCO2, Arterial 32.9 (*)     pO2, Arterial 70.1 (*)     Base Excess, Arterial -2.9 (*)     O2 Saturation, Arterial 93.7 (*)     All other components within normal limits   BLOOD GAS, ARTERIAL - Abnormal; Notable  for the following:     Base Excess, Arterial -1.4 (*)     All other components within normal limits   BLOOD CULTURE - Normal   BLOOD CULTURE - Normal   LEGIONELLA ANTIGEN, URINE - Normal   STREP PNEUMO AG, URINE OR CSF - Normal   BNP (IN-HOUSE) - Normal   TROPONIN (IN-HOUSE) - Normal   LACTIC ACID, PLASMA - Normal   RESPIRATORY CULTURE   RESPIRATORY PANEL, PCR   LACTIC ACID REFLEX TIMER   BLOOD GAS, ARTERIAL   BLOOD GAS, ARTERIAL   BASIC METABOLIC PANEL   LACTIC ACID, PLASMA   CBC WITH AUTO DIFFERENTIAL   CBC AND DIFFERENTIAL    Narrative:     The following orders were created for panel order CBC & Differential.  Procedure                               Abnormality         Status                     ---------                               -----------         ------                     CBC Auto Differential[[id number]]         Abnormal            Final result                 Please view results for these tests on the individual orders.   EXTRA TUBES    Narrative:     The following orders were created for panel order Extra Tubes.  Procedure                               Abnormality         Status                     ---------                               -----------         ------                     Lavender Top[[id number]]                                     Final result               Green Top (Gel)[[id number]]                                  Final result                 Please view results for these tests on the individual orders.   LAVENDER TOP   GREEN TOP   CBC AND DIFFERENTIAL    Narrative:     The following orders were created for panel order CBC & Differential.  Procedure                               Abnormality         Status                     ---------                               -----------         ------                     CBC Auto Differential[[id number]]                                                         Please view results for these tests on the individual orders.     Xr Chest 2 View    Result  Date: 9/12/2018  Narrative: Chest 2 view on  9/12/2018 CLINICAL INDICATION: Shortness of breath, cough COMPARISON: None FINDINGS: There is a moderate size right pleural effusion that may be partially loculated superiorly and laterally. There is adjacent right-sided opacity likely representing pneumonia. Heart is upper limits normal for size. Mild bilateral interstitial opacities may be chronic in nature versus mild edema or atypical pneumonia. Vascular calcification is noted in the aorta.     Impression: Right pleural effusion with likely right-sided pneumonia. Bilateral interstitial opacities may be chronic in nature versus mild edema or atypical pneumonia. Electronically signed by:  Bin Moeller  9/12/2018 1:08 AM CDT Workstation: [id number]    Xr Chest 1 View    Result Date: 9/12/2018  Narrative: PROCEDURE: Chest, AP upright portable at 8:53 PM. INDICATION: Dyspnea, J18.1 Lobar pneumonia, unspecified organism R09.02 Hypoxemia J90 Pleural effusion, not elsewhere classified COMPARISON: Exam earlier same date at 2:02 PM. Mild cardiomegaly with increase in pulmonary vascular congestion. Mild cardiomegaly. Persistent consolidation and/or volume loss in the right lower lobe with associated moderate right pleural effusion. Also some consolidation and infiltrate peripherally right upper lobe. New infiltrate developing in the left lower lobe.     Impression: Persistent consolidation and volume loss right lower lobe. No change consolidation infiltrate peripherally in the right upper lobe. No change in moderate-sized right pleural effusion. New Infiltrate developing left lower lobe. Increasing pulmonary vascular congestion. [id number] Electronically signed by:  Félix Nicolas MD  9/12/2018 9:13 PM CDT Workstation: [id number]    Xr Chest 1 View    Result Date: 9/12/2018  Narrative: EXAM:         Radiograph(s), Chest VIEWS:   Frontal  ; 1     DATE/TIME:  9/12/2018 2:29 PM CDT            INDICATION:   severe shortness  of breath, J18.1 Lobar pneumonia, unspecified organism R09.02 Hypoxemia J90 Pleural effusion, not elsewhere classified  COMPARISON:  CXR: 9/12/18 at 12:59 hours         FINDINGS:         - lines/tubes:    none   - cardiac:         size within normal limits       - mediastinum: contour within normal limits       - lungs:         Consolidated airspace disease in the right base, and right upper lung. The left lung is essentially clear and unchanged.           - pleura:         Small amount of right-sided pleural fluid.               - osseous:         unremarkable for age                - misc.:        Impression: CONCLUSION:    1. Consolidated airspace disease in the right lung. 2. Small amount right-sided pleural fluid.                                                   Electronically signed by:  HILLARY Barth MD  9/12/2018 2:30 PM CDT Workstation: [id number]            MDM  Number of Diagnoses or Management Options  Hypoxia:   Pleural effusion:   Pneumonia of right upper lobe due to infectious organism (CMS/HCC):      Amount and/or Complexity of Data Reviewed  Clinical lab tests: reviewed  Tests in the radiology section of CPT®: reviewed  Tests in the medicine section of CPT®: reviewed    Patient Progress  Patient progress: stable    Patient found to have pneumonia.  He is requiring oxygen supplementation to maintain oxygen saturation greater than 92%.  He also has a small pleural effusion on the right, same side as the pneumonia.  Laboratory studies are unremarkable.  Troponin and BNP are not elevated.  Electrolytes are unremarkable.  Patient will be admitted for further evaluation treatment.  Antibiotics are started.  Patient and family are agreeable.    Final diagnoses:   Pneumonia of right upper lobe due to infectious organism (CMS/HCC)   Hypoxia   Pleural effusion            Guillermo Stokes,   09/13/18 0052

## 2018-09-12 NOTE — H&P
"    HISTORY AND PHYSICAL  NAME: Trae De La Paz  : 1935  MRN: [id number]    DATE OF ADMISSION: 18    DATE & TIME SEEN: 18 2:17 AM    PCP: Félix Tran MD    CODE STATUS:   Code Status and Medical Interventions:   Ordered at: 18 0410     Level Of Support Discussed With:    Patient     Code Status:    CPR     Medical Interventions (Level of Support Prior to Arrest):    Full       CHIEF COMPLAINT \"Couldn't breath\"    HPI:  Trae De La Paz is a 83 y.o. male who presents with shortness of air.     Patient states that over the last 24-48 hours he had worsening shortness of air and cough. He was initially short of air with exertion but tonight it was at rest as well. He denies any sputum production. He denies any fever, chills, nausea, or vomiting. Patient had been fit and well until 48 hours ago.     Of note patient just returned from a long trip that include [place], [place], and then back home.  Patient states they had frequent stops. No leg swelling or pain per patient.    CONCURRENT MEDICAL HISTORY:  Past Medical History:   Diagnosis Date   • Hypertension        PAST SURGICAL HISTORY:  Past Surgical History:   Procedure Laterality Date   • HERNIA REPAIR         FAMILY HISTORY:  History reviewed. No pertinent family history.     SOCIAL HISTORY:  Social History     Social History   • Marital status:      Spouse name: N/A   • Number of children: N/A   • Years of education: N/A     Occupational History   • Not on file.     Social History Main Topics   • Smoking status: Current Some Day Smoker   • Smokeless tobacco: Not on file      Comment: pt quit 40 years ago   • Alcohol use Yes      Comment: occasionaly   • Drug use: Unknown   • Sexual activity: Defer     Other Topics Concern   • Not on file     Social History Narrative   • No narrative on file       HOME MEDICATIONS:  Prior to Admission medications    Medication Sig Start Date End Date Taking? Authorizing Provider   albuterol " (PROVENTIL HFA;VENTOLIN HFA) 108 (90 Base) MCG/ACT inhaler Inhale 2 puffs Every 4 (Four) Hours As Needed for Wheezing.   Yes Provider, Historical, MD   amLODIPine (NORVASC) 5 MG tablet Take 5 mg by mouth Daily.   Yes Provider, Historical, MD   tamsulosin (FLOMAX) 0.4 MG capsule 24 hr capsule Take 1 capsule by mouth Every Night.   Yes Provider, Historical, MD       ALLERGIES:  Iodine    REVIEW OF SYSTEMS  Review of Systems   Constitutional: Positive for activity change and fatigue. Negative for appetite change and fever.   HENT: Negative for ear pain and sore throat.    Eyes: Negative for pain and visual disturbance.   Respiratory: Positive for cough and shortness of breath.    Cardiovascular: Negative for chest pain and palpitations.   Gastrointestinal: Negative for abdominal pain, constipation, diarrhea, nausea and vomiting.   Endocrine: Negative for cold intolerance and heat intolerance.   Genitourinary: Negative for difficulty urinating and dysuria.   Musculoskeletal: Negative for arthralgias and gait problem.   Skin: Negative for color change and rash.   Neurological: Negative for dizziness, weakness and headaches.   Hematological: Negative for adenopathy. Does not bruise/bleed easily.   Psychiatric/Behavioral: Negative for agitation, confusion and sleep disturbance.       PHYSICAL EXAM:  Temp:  [97.7 °F (36.5 °C)] 97.7 °F (36.5 °C)  Heart Rate:  [] 89  Resp:  [20-24] 20  BP: (160-196)/(73-83) 196/73  Body mass index is 27.44 kg/m².     Physical Exam   Constitutional: He is oriented to person, place, and time. He appears well-developed and well-nourished. No distress.   HENT:   Head: Normocephalic and atraumatic.   Right Ear: External ear normal.   Left Ear: External ear normal.   Nose: Nose normal.   Eyes: Pupils are equal, round, and reactive to light. Conjunctivae and EOM are normal.   Neck: Normal range of motion. Neck supple.   Cardiovascular: Normal rate, regular rhythm, normal heart sounds and  intact distal pulses.    Pulmonary/Chest: Effort normal and breath sounds normal. No respiratory distress. He has no wheezes. He has no rales. He exhibits no tenderness.   Nasal cannula in place. Coarse L base and R apex. Diminished R base.   Abdominal: Soft. Bowel sounds are normal. He exhibits no distension and no mass. There is no tenderness. There is no rebound and no guarding.   Musculoskeletal: Normal range of motion. He exhibits no edema.   Neurological: He is alert and oriented to person, place, and time.   Skin: Skin is warm and dry. No rash noted. He is not diaphoretic. No erythema. No pallor.   Psychiatric: He has a normal mood and affect. His behavior is normal.   Nursing note and vitals reviewed.      DIAGNOSTIC DATA:   Lab Results (last 24 hours)     Procedure Component Value Units Date/Time    BNP [[id number]]  (Normal) Collected:  09/12/18 0039    Specimen:  Blood Updated:  09/12/18 0125     proBNP 196.0 pg/mL     Troponin [[id number]]  (Normal) Collected:  09/12/18 0039    Specimen:  Blood Updated:  09/12/18 0125     Troponin I <0.012 ng/mL     Comprehensive Metabolic Panel [[id number]]  (Abnormal) Collected:  09/12/18 0039    Specimen:  Blood Updated:  09/12/18 0115     Glucose 142 (H) mg/dL      BUN 29 (H) mg/dL      Creatinine 1.31 (H) mg/dL      Sodium 141 mmol/L      Potassium 4.8 mmol/L      Chloride 101 mmol/L      CO2 29.0 mmol/L      Calcium 10.0 mg/dL      Total Protein 7.6 g/dL      Albumin 3.70 g/dL      ALT (SGPT) 26 U/L      AST (SGOT) 28 U/L      Alkaline Phosphatase 120 U/L      Total Bilirubin 0.5 mg/dL      eGFR Non African Amer 53 mL/min/1.73      Globulin 3.9 (H) gm/dL      A/G Ratio 0.9 (L) g/dL      BUN/Creatinine Ratio 22.1     Anion Gap 11.0 mmol/L     Narrative:       The MDRD GFR formula is only valid for adults with stable renal function between ages 18 and 70.    CBC & Differential [[id number]] Collected:  09/12/18 0039    Specimen:  Blood Updated:  09/12/18 0103    Narrative:        The following orders were created for panel order CBC & Differential.  Procedure                               Abnormality         Status                     ---------                               -----------         ------                     CBC Auto Differential[[id number]]         Abnormal            Final result                 Please view results for these tests on the individual orders.    CBC Auto Differential [[id number]]  (Abnormal) Collected:  09/12/18 0039    Specimen:  Blood Updated:  09/12/18 0103     WBC 13.70 (H) 10*3/mm3      RBC 4.83 10*6/mm3      Hemoglobin 14.7 g/dL      Hematocrit 43.8 %      MCV 90.7 fL      MCH 30.4 pg      MCHC 33.6 g/dL      RDW 14.5 %      RDW-SD 48.8 (H) fl      MPV 10.5 fL      Platelets 342 10*3/mm3      Neutrophil % 76.1 %      Lymphocyte % 12.6 %      Monocyte % 9.1 %      Eosinophil % 1.8 %      Basophil % 0.1 %      Immature Grans % 0.3 %      Neutrophils, Absolute 10.43 (H) 10*3/mm3      Lymphocytes, Absolute 1.72 10*3/mm3      Monocytes, Absolute 1.24 (H) 10*3/mm3      Eosinophils, Absolute 0.25 10*3/mm3      Basophils, Absolute 0.02 10*3/mm3      Immature Grans, Absolute 0.04 (H) 10*3/mm3         Estimated Creatinine Clearance: 43.2 mL/min (A) (by C-G formula based on SCr of 1.31 mg/dL (H)).     Imaging Results (last 24 hours)     Procedure Component Value Units Date/Time    XR Chest 2 View [[id number]] Collected:  09/12/18 0049     Updated:  09/12/18 0109    Narrative:         Chest 2 view on  9/12/2018     CLINICAL INDICATION: Shortness of breath, cough    COMPARISON: None    FINDINGS: There is a moderate size right pleural effusion that  may be partially loculated superiorly and laterally. There is  adjacent right-sided opacity likely representing pneumonia. Heart  is upper limits normal for size. Mild bilateral interstitial  opacities may be chronic in nature versus mild edema or atypical  pneumonia. Vascular calcification is noted in the aorta.      Impression:        Right pleural effusion with likely right-sided  pneumonia. Bilateral interstitial opacities may be chronic in  nature versus mild edema or atypical pneumonia.    Electronically signed by:  Bin Meoller  9/12/2018 1:08 AM  CDT Workstation: [id number]          I reviewed the patient's new clinical results.    ASSESSMENT AND PLAN: This is a 83 y.o. male with:    Active Hospital Problems    Diagnosis Date Noted   • **Sepsis (CMS/formerly [facility]) [A41.9] 09/12/2018   • Pneumonia of right upper lobe due to infectious organism (CMS/formerly [facility]) [J18.1] 09/12/2018   • Hypertension [I10] 09/12/2018   • BPH (benign prostatic hyperplasia) [N40.0] 09/12/2018   • SUMMER (acute kidney injury) (CMS/formerly [facility]) [N17.9] 09/12/2018     #. Sepsis secondary to CAP. Rocephin. Doxy. Pan culture pending. Lucio cultures.  #. Acute hypoxic respiratory failure. Patient currently on 3 lpm nasal cannula. Suspect secondary to CAP. Treat CAP. Wean oxygen. Ambulate.  #. CAP. Treatment as above. RUL.  #. HTN. Norvasc.  #. BPH. Flomax.  #. Shortness of air. CAP likely. Low likelihood for PE. D-dimer ordered. ECHO ordered.  #. SUMMER. Unsure of baseline, but per patient no CKD. Gentle hydration. Close monitoring. Consult nephrology as needed.    Will monitor patient's hospital course and adjust treatment as hospital course dictates.    DVT prophylaxis: SCDs  Code status is   Code Status and Medical Interventions:   Ordered at: 09/12/18 0414     Limited Support to NOT Include:    Cardioversion/Defibrillation    Intubation     Level Of Support Discussed With:    Patient     Code Status:    No CPR     Medical Interventions (Level of Support Prior to Arrest):    Limited      I discussed the patients findings and my recommendations with patient and nursing staff.           This document has been electronically signed by Ho Blackmon MD on September 12, 2018 2:17 AM

## 2018-09-12 NOTE — PROGRESS NOTES
[facility] Medicine Services  INPATIENT PROGRESS NOTE    Length of Stay: 0  Date of Admission: 9/11/2018  Primary Care Physician: Félix Tran MD    Subjective   Chief Complaint: shortness of breath  HPI:  83 year old male with a history of HTN who presented to there ED with a complaint of 1-2 days of shortness of breath and cough.  He also reported a car trip from [place], [place], and back to [place].  Chest x-ray revealed bilateral opacities and right pleural effusion.  D. Dimer is also elevated.  He was admitted for sepsis from pneumonia.  He was initiated on antibiotics for CAP.  He will undergo CTA.  He reports he is already feeling better than last night.     Review of Systems   Constitutional: Negative for chills and fever.   Respiratory: Positive for cough and shortness of breath.    Cardiovascular: Negative for chest pain and leg swelling.   Gastrointestinal: Negative for abdominal pain, diarrhea, nausea and vomiting.      All pertinent negatives and positives are as above. All other systems have been reviewed and are negative unless otherwise stated.     Objective    Temp:  [97.5 °F (36.4 °C)-98 °F (36.7 °C)] 97.5 °F (36.4 °C)  Heart Rate:  [] 107  Resp:  [20-24] 20  BP: (123-196)/(66-84) 123/66    Physical Exam   Constitutional: He is oriented to person, place, and time. He appears well-developed and well-nourished.   HENT:   Head: Normocephalic.   Eyes: Conjunctivae are normal.   Cardiovascular: Normal rate, regular rhythm, normal heart sounds and intact distal pulses.    Pulmonary/Chest: Effort normal. No respiratory distress. He has decreased breath sounds in the right lower field. He has rales.   Abdominal: Soft. Bowel sounds are normal. He exhibits no distension. There is no tenderness.   Musculoskeletal: He exhibits no edema or deformity.   Neurological: He is alert and oriented to person, place, and time.   Skin: Skin is warm and dry. No erythema.   Vitals  reviewed.    Results Review:  I have reviewed the labs, radiology results, and diagnostic studies.    Laboratory Data:     Results from last 7 days  Lab Units 09/12/18  0600 09/12/18  0039   SODIUM mmol/L 139 141   POTASSIUM mmol/L 4.2 4.8   CHLORIDE mmol/L 102 101   CO2 mmol/L 25.0 29.0   BUN mg/dL 24* 29*   CREATININE mg/dL 0.97 1.31*   GLUCOSE mg/dL 178* 142*   CALCIUM mg/dL 9.3 10.0   BILIRUBIN mg/dL  --  0.5   ALK PHOS U/L  --  120   ALT (SGPT) U/L  --  26   AST (SGOT) U/L  --  28   ANION GAP mmol/L 12.0 11.0     Estimated Creatinine Clearance: 63.9 mL/min (by C-G formula based on SCr of 0.97 mg/dL).            Results from last 7 days  Lab Units 09/12/18  0039   WBC 10*3/mm3 13.70*   HEMOGLOBIN g/dL 14.7   HEMATOCRIT % 43.8   PLATELETS 10*3/mm3 342           Culture Data:   No results found for: BLOODCX  No results found for: URINECX  No results found for: RESPCX  No results found for: WOUNDCX  No results found for: STOOLCX  No components found for: BODYFLD    Radiology Data:   Imaging Results (last 24 hours)     Procedure Component Value Units Date/Time    XR Chest 2 View [[id number]] Collected:  09/12/18 0049     Updated:  09/12/18 0109    Narrative:         Chest 2 view on  9/12/2018     CLINICAL INDICATION: Shortness of breath, cough    COMPARISON: None    FINDINGS: There is a moderate size right pleural effusion that  may be partially loculated superiorly and laterally. There is  adjacent right-sided opacity likely representing pneumonia. Heart  is upper limits normal for size. Mild bilateral interstitial  opacities may be chronic in nature versus mild edema or atypical  pneumonia. Vascular calcification is noted in the aorta.      Impression:       Right pleural effusion with likely right-sided  pneumonia. Bilateral interstitial opacities may be chronic in  nature versus mild edema or atypical pneumonia.    Electronically signed by:  Bin Moeller  9/12/2018 1:08 AM  CDT Workstation: [id number]           I have reviewed the patient's current medications.     Assessment/Plan     Hospital Problem List     * (Principal)Sepsis (CMS/[facility])    Pneumonia of right upper lobe due to infectious organism (CMS/[facility])    Hypertension (Chronic)    BPH (benign prostatic hyperplasia) (Chronic)    SUMMER (acute kidney injury) (CMS/[facility])          Plan:    Rocephin day 1/7  Doxycycline day 1/7  Supplemental oxygen  Duoneb  IV fluid:  ml/hr increased from 50 ml/hr as lactic acid is rising  Follow cultures, obtain sputum culture, respiratory to induce if necessary  CTA chest (13 hour premedicate for iodine allergy), echocardiogram  DVT prophylaxis: lovenox          This document has been electronically signed by LYLE Carter on September 12, 2018 10:15 AM        Addendum:    Contacted by cardiology regarding echo revealing RV dilation with PAH.  He is already to undergo CTA, but requires premedication due to contrast allergy.  Will increase lovenox to 1 mg/kg dosing empirically due to increased concern for pulmonary embolism.         This document has been electronically signed by LYLE Carter on September 12, 2018 12:18 PM      Addendum:  Lactic acid increased from 3.2 to 5.3.  Suspect secondary to hypoperfusion from severe sepsis from pneumonia vs pulmonary embolism.  Start adult sepsis bundle.  IV fluid bolus 30 ml/kg.  Repeat CXR.  Lactic acid q 6 until normalized. He is on full anticoagulation with lovenox for possible PE empirically.  Transfer to Stepdown.  Discussed with Dr. Raymond Bartlett.        This document has been electronically signed by LYLE Carter on September 12, 2018 2:05 PM

## 2018-09-13 PROBLEM — J90 PLEURAL EFFUSION: Chronic | Status: ACTIVE | Noted: 2018-01-01

## 2018-09-13 PROBLEM — I27.20 PULMONARY HYPERTENSION (HCC): Chronic | Status: ACTIVE | Noted: 2018-01-01

## 2018-09-13 NOTE — PROGRESS NOTES
Progress Note  Ariel Moreland MD  Hospitalist    Date of visit: 9/13/2018     LOS: 1 day   Patient Care Team:  Félix Tran MD as PCP - General (Family Medicine)    Chief Complaint: worsening dyspnea    Subjective     Interval History:     Patient Complaints: shortness of breath, worse respiratory distress.    History taken from: patient    Medication Review:   Current Facility-Administered Medications   Medication Dose Route Frequency Provider Last Rate Last Dose   • acetaminophen (TYLENOL) tablet 650 mg  650 mg Oral Q4H PRN Ho Blackmon MD       • albuterol (PROVENTIL) nebulizer solution 0.083% 2.5 mg/3mL  2.5 mg Nebulization Q6H PRN Ho Blackmon MD   2.5 mg at 09/12/18 1610   • amLODIPine (NORVASC) tablet 5 mg  5 mg Oral Daily Ho Blackmon MD   5 mg at 09/12/18 0932   • cefTRIAXone (ROCEPHIN) 1 g/100 mL 0.9% NS (MBP)  1 g Intravenous Q24H Ho Blackmon MD   1 g at 09/13/18 0636   • doxycycline (VIBRAMYCIN) 100 mg/250 mL 0.9% NS VTB  100 mg Intravenous Q12H Ho Blackmon MD   100 mg at 09/13/18 1020   • furosemide (LASIX) injection 20 mg  20 mg Intravenous Q12H Ariel Moreland MD       • Hold medication  1 each Does not apply Continuous PRN Ariel Moreland MD       • ipratropium-albuterol (DUO-NEB) nebulizer solution 3 mL  3 mL Nebulization Q6H - RT Ho Blackmon MD   3 mL at 09/13/18 1821   • LORazepam (ATIVAN) injection 0.5 mg  0.5 mg Intravenous Q6H PRN Ariel Moreland MD   0.5 mg at 09/13/18 1208   • ondansetron (ZOFRAN) injection 4 mg  4 mg Intravenous Q6H PRN Ho Blackmon MD   4 mg at 09/13/18 0558   • sodium chloride 0.9 % flush 1-10 mL  1-10 mL Intravenous PRN Ho Blackmon MD       • sodium chloride 0.9 % flush 10 mL  10 mL Intravenous PRN Kike, Guillermo R, DO       • tamsulosin (FLOMAX) 24 hr capsule 0.4 mg  0.4 mg Oral Nightly Ho Blackmon MD   0.4 mg at 09/12/18 4780       Review of Systems:   Review of Systems   Constitutional: Positive for fatigue.   Respiratory: Positive for  cough and shortness of breath.    Cardiovascular: Negative for chest pain, palpitations and leg swelling.   Gastrointestinal: Negative for abdominal distention, abdominal pain, diarrhea and vomiting.   Genitourinary: Negative for discharge, frequency and urgency.   Musculoskeletal: Positive for back pain. Negative for arthralgias and gait problem.   Skin: Positive for pallor.   Neurological: Positive for weakness. Negative for seizures, syncope, light-headedness and headaches.   Psychiatric/Behavioral: Negative for agitation, behavioral problems and confusion.   All other systems reviewed and are negative.      Objective     Vital Signs  Temp:  [96.8 °F (36 °C)-98.4 °F (36.9 °C)] 98.2 °F (36.8 °C)  Heart Rate:  [] 121  Resp:  [16-31] 16  BP: (126-200)/(62-92) 150/70  FiO2 (%):  [70 %] 70 %    Physical Exam:  Physical Exam   Constitutional: He is oriented to person, place, and time. He appears well-developed and well-nourished. He appears ill. He appears distressed.   HENT:   Head: Normocephalic and atraumatic.   Eyes: Pupils are equal, round, and reactive to light. EOM are normal. No scleral icterus.   Neck: Normal range of motion. Neck supple.   Cardiovascular: Regular rhythm.  Tachycardia present.    Pulmonary/Chest: He is in respiratory distress. He has wheezes. He has rales.   Decreased air entry R chest   Abdominal: Soft. Bowel sounds are normal. He exhibits no distension. There is no tenderness.   Musculoskeletal: Normal range of motion. He exhibits no edema.   Neurological: He is alert and oriented to person, place, and time. No cranial nerve deficit.   Skin: Skin is warm and dry. There is pallor.   Psychiatric: He has a normal mood and affect. His behavior is normal.   Vitals reviewed.       Results Review:    Lab Results (last 24 hours)     Procedure Component Value Units Date/Time    Body Fluid Cell Count With Differential - Body Fluid, Pleural Cavity [[id number]] Collected:  09/13/18 5288     Specimen:  Body Fluid from Pleural Cavity Updated:  09/13/18 1548    Narrative:       The following orders were created for panel order Body Fluid Cell Count With Differential - Body Fluid, Pleural Cavity.  Procedure                               Abnormality         Status                     ---------                               -----------         ------                     Body fluid cell count - ...[[id number]]  Abnormal            Final result               Body fluid differential ...[[id number]]                      Final result                 Please view results for these tests on the individual orders.    Body fluid differential - Body Fluid, [[id number]] Collected:  09/13/18 1359    Specimen:  Body Fluid from Pleural Cavity Updated:  09/13/18 1548     Neutrophils, Fluid 35 %      Mononuclear, Fluid 65 %     Protein, Body Fluid - Pleural Fluid, Pleural Cavity [[id number]] Collected:  09/13/18 1359    Specimen:  Pleural Fluid from Pleural Cavity Updated:  09/13/18 1510     Protein, Total, Fluid 5.0 g/dL     Lactate Dehydrogenase, Body Fluid - Body Fluid, Pleural Cavity [[id number]] Collected:  09/13/18 1359    Specimen:  Pleural Fluid from Pleural Cavity Updated:  09/13/18 1510     Lactate Dehydrogenase (LD), Fluid 1,150 U/L     Body fluid cell count - Body Fluid, [[id number]]  (Abnormal) Collected:  09/13/18 1359    Specimen:  Body Fluid from Pleural Cavity Updated:  09/13/18 1457     WBC, Fluid 1,162.5 (H) /mm3      RBC, Fluid 20,500 (H) /mm3      Color, Fluid Red     Appearance, Fluid Slightly Cloudy (A)     Volume, Fluid 1,650.0 mL     Blood Gas, Arterial [[id number]]  (Abnormal) Collected:  09/13/18 1425    Specimen:  Arterial Blood Updated:  09/13/18 1446     Site Right Radial     Roverto's Test N/A     pH, Arterial 7.389 pH units      pCO2, Arterial 43.2 mm Hg      pO2, Arterial 157.0 (H) mm Hg      HCO3, Arterial 26.1 (H) mmol/L      Base Excess, Arterial 0.7 mmol/L      O2 Saturation, Arterial 99.4 (H) %       Barometric Pressure for Blood Gas 750 mmHg      Modality CPAP     FIO2 100 %      Ventilator Mode NA     CPAP 8.0 cmH2O      Collected by dl    Body Fluid Culture - Body Fluid, Pleural Cavity [[id number]] Collected:  09/13/18 1425    Specimen:  Body Fluid from Pleural Cavity Updated:  09/13/18 1425    Extra Tubes [[id number]] Collected:  09/13/18 1154    Specimen:  Blood from Blood, Venous Line Updated:  09/13/18 1300    Narrative:       The following orders were created for panel order Extra Tubes.  Procedure                               Abnormality         Status                     ---------                               -----------         ------                     Light Blue Top[[id number]]                                   Final result               Lavender Top[[id number]]                                     Final result               Gold Top - SST[[id number]]                                   Final result                 Please view results for these tests on the individual orders.    Lavender Top [[id number]] Collected:  09/13/18 1155    Specimen:  Blood Updated:  09/13/18 1300     Extra Tube hold for add-on     Comment: Auto resulted       Gold Top - SST [[id number]] Collected:  09/13/18 1157    Specimen:  Blood Updated:  09/13/18 1300     Extra Tube Hold for add-ons.     Comment: Auto resulted.       Light Blue Top [[id number]] Collected:  09/13/18 1154    Specimen:  Blood Updated:  09/13/18 1300     Extra Tube hold for add-on     Comment: Auto resulted       SCANNED - LABS [[id number]] Resulted:  09/11/18      Updated:  09/13/18 1237    Protime-INR [[id number]]  (Normal) Collected:  09/13/18 1154    Specimen:  Blood Updated:  09/13/18 1220     Protime 13.2 Seconds      INR 1.02    Narrative:       Therapeutic range for most indications is 2.0-3.0 INR,  or 2.5-3.5 for mechanical heart valves.    Lactic Acid, Plasma [[id number]]  (Normal) Collected:  09/13/18 1134    Specimen:  Blood Updated:  09/13/18 1201      Lactate 1.5 mmol/L     Blood Culture - Blood, [[id number]]  (Normal) Collected:  09/12/18 0603    Specimen:  Blood from Arm, Left Updated:  09/13/18 0615     Blood Culture No growth at 24 hours    Basic Metabolic Panel [[id number]]  (Abnormal) Collected:  09/13/18 0536    Specimen:  Blood Updated:  09/13/18 0600     Glucose 192 (H) mg/dL      BUN 20 mg/dL      Creatinine 0.86 mg/dL      Sodium 138 mmol/L      Potassium 3.9 mmol/L      Chloride 102 mmol/L      CO2 24.0 mmol/L      Calcium 9.4 mg/dL      eGFR Non African Amer 85 mL/min/1.73      BUN/Creatinine Ratio 23.3     Anion Gap 12.0 mmol/L     Narrative:       The MDRD GFR formula is only valid for adults with stable renal function between ages 18 and 70.    Lactic Acid, Plasma [[id number]]  (Normal) Collected:  09/13/18 0536    Specimen:  Blood Updated:  09/13/18 0555     Lactate 1.5 mmol/L     CBC & Differential [[id number]] Collected:  09/13/18 0536    Specimen:  Blood Updated:  09/13/18 0547    Narrative:       The following orders were created for panel order CBC & Differential.  Procedure                               Abnormality         Status                     ---------                               -----------         ------                     CBC Auto Differential[[id number]]        Abnormal            Final result                 Please view results for these tests on the individual orders.    CBC Auto Differential [[id number]]  (Abnormal) Collected:  09/13/18 0536    Specimen:  Blood Updated:  09/13/18 0547     WBC 26.18 (H) 10*3/mm3      RBC 4.63 10*6/mm3      Hemoglobin 13.8 g/dL      Hematocrit 40.4 %      MCV 87.3 fL      MCH 29.8 pg      MCHC 34.2 g/dL      RDW 14.4 %      RDW-SD 46.1 (H) fl      MPV 10.5 fL      Platelets 335 10*3/mm3      Neutrophil % 91.7 (H) %      Lymphocyte % 2.1 (L) %      Monocyte % 5.7 %      Eosinophil % 0.0 %      Basophil % 0.0 %      Immature Grans % 0.5 %      Neutrophils, Absolute 24.00 (H) 10*3/mm3       Lymphocytes, Absolute 0.54 (L) 10*3/mm3      Monocytes, Absolute 1.49 (H) 10*3/mm3      Eosinophils, Absolute 0.00 10*3/mm3      Basophils, Absolute 0.01 10*3/mm3      Immature Grans, Absolute 0.14 (H) 10*3/mm3     Blood Culture - Blood, [[id number]]  (Normal) Collected:  09/12/18 0305    Specimen:  Blood from Arm, Left Updated:  09/13/18 0315     Blood Culture No growth at 24 hours    Lactic Acid, Plasma [[id number]]  (Normal) Collected:  09/12/18 2240    Specimen:  Blood Updated:  09/12/18 2258     Lactate 1.9 mmol/L     Blood Gas, Arterial [[id number]]  (Abnormal) Collected:  09/12/18 2112    Specimen:  Arterial Blood Updated:  09/12/18 2245     Site Right Radial     Roverto's Test Positive     pH, Arterial 7.408 pH units      pCO2, Arterial 36.2 mm Hg      pO2, Arterial 96.4 mm Hg      HCO3, Arterial 22.8 mmol/L      Base Excess, Arterial -1.4 (L) mmol/L      O2 Saturation, Arterial 97.7 %      Barometric Pressure for Blood Gas 751 mmHg      Modality NRB     Flow Rate 15.0 lpm      Ventilator Mode NA     Collected by Dimitri    Blood Gas, Arterial [[id number]]  (Abnormal) Collected:  09/12/18 1435    Specimen:  Arterial Blood Updated:  09/12/18 1948     Site Left Brachial     Roverto's Test Positive     pH, Arterial 7.411 pH units      pCO2, Arterial 32.9 (L) mm Hg      pO2, Arterial 70.1 (L) mm Hg      HCO3, Arterial 20.9 mmol/L      Base Excess, Arterial -2.9 (L) mmol/L      O2 Saturation, Arterial 93.7 (L) %      Barometric Pressure for Blood Gas 751 mmHg      Modality Nasal Cannula     Flow Rate 4.0 lpm      Ventilator Mode NA     Collected by ROBERT Andrade          Imaging Results (last 24 hours)     Procedure Component Value Units Date/Time    US Thoracentesis [[id number]] Collected:  09/13/18 1348    Specimen:  Body Fluid Updated:  09/13/18 1616    Narrative:         PROCEDURE: Ultrasound guided thoracentesis    HISTORY:  Right pleural effusion    COMPARISON:  none    TECHNIQUE:    Informed consent was obtained following  a discussion of the  procedure with the patient, with benefits, alternatives, and with  risks explained as possible bleeding, infection, damage to  adjacent organs and pneumothorax which would require a chest  tube.    An appropriate site was marked in the posterior right hemithorax.  Following initial imaging for localization of an optimal site of  intervention, the skin was prepped and draped in the usual  sterile fashion.  10 mL of lidocaine was used for local  anesthesia. A 19-gauge Yueh needle and catheter was inserted into  the pleural space under ultrasound guidance. The needle was  removed and fluid was collected into a vacuum bottle.    FINDINGS:  Approximately 1600 mL of serous fluid was collected and submitted  for laboratory evaluation, as requested.    The patient tolerated the procedure well and left the department  in stable condition. No immediate post procedure complications.      Impression:       CONCLUSION:   Successful ultrasound-guided thoracentesis with removal of 1600  mL of serous fluid, as described above.    Electronically signed by:  José Miguel Campbell MD  9/13/2018 4:15 PM CDT  Workstation: [id number]    XR Chest 1 View [[id number]] Collected:  09/13/18 1418     Updated:  09/13/18 1437    Narrative:         EXAM:         Radiograph(s), Chest   VIEWS:   Frontal  ; 1       DATE/TIME:  9/13/2018 2:35 PM CDT                INDICATION:   Post right thoracentesis per Dr. Campbell., J18.1 Lobar  pneumonia, unspecified organism R09.02 Hypoxemia J90 Pleural  effusion, not elsewhere classified    COMPARISON:  CXR: 9/12/18             FINDINGS:             - lines/tubes:    none     - cardiac:         size within normal limits         - mediastinum: contour within normal limits         - lungs:         Bilateral airspace disease, improved since the  previous study.             - pleura:         no evidence of  fluid                  - osseous:         unremarkable for age                  - misc.:          Impression:       CONCLUSION:        1. Improving bilateral airspace disease.                                                             Electronically signed by:  HILLARY Barth MD  9/13/2018 2:36  PM CDT Workstation: [id number]    CT Angiogram Chest With Contrast [[id number]] Collected:  09/13/18 0211     Updated:  09/13/18 0238    Narrative:         CT angiogram chest with contrast on 9/13/2018     CLINICAL INDICATION: Respiratory failure, elevated d-dimer    TECHNIQUE: Multiple axial images are obtained throughout the  chest following the administration of IV contrast.  Computer  generated 3D reconstructions/MIPS were performed. This exam was  performed according to our departmental dose-optimization  program, which includes automated exposure control, adjustment of  the mA and/or kV according to patient size and/or use of  iterative reconstruction technique.   Total DLP is 432.6 mGy*cm.    COMPARISON: None     FINDINGS: There is a large right pleural effusion. There is  slight shift of the heart and mediastinum to the left related to  this large right pleural effusion. There is some partial aeration  of the right upper and right middle lobe with complete  atelectasis of the right lower lobe. There are areas of  atelectasis in the right upper and right middle lobe as well.  There is patchy groundglass and airspace opacity in the left lung  consistent with edema and/or pneumonia. There is anterior to  posterior narrowing of the trachea and mainstem bronchi  suggesting tracheobronchomalacia. There is no left pleural  effusion or pericardial effusion. There is prominence of the main  and central pulmonary arteries suggesting pulmonary arterial  hypertension. There are no filling defects within the pulmonary  arteries to suggest a pulmonary embolus. Limited visualized upper  abdomen is unremarkable. There is no thoracic adenopathy.  Degenerative changes are noted in the spine.      Impression:       1. No evidence  of pulmonary embolus.  2. Large right pleural effusion with atelectasis of much of the  right lung. Consider thoracentesis.  3. Patchy left-sided opacity consistent with edema and/or  pneumonia.    Electronically signed by:  Bin Moeller  9/13/2018 2:37 AM  CDT Workstation: [id number]    XR Chest 1 View [[id number]] Collected:  09/12/18 2053     Updated:  09/12/18 2114    Narrative:       PROCEDURE: Chest, AP upright portable at 8:53 PM.    INDICATION: Dyspnea, J18.1 Lobar pneumonia, unspecified organism  R09.02 Hypoxemia J90 Pleural effusion, not elsewhere classified    COMPARISON: Exam earlier same date at 2:02 PM.    Mild cardiomegaly with increase in pulmonary vascular congestion.    Mild cardiomegaly. Persistent consolidation and/or volume loss in  the right lower lobe with associated moderate right pleural  effusion. Also some consolidation and infiltrate peripherally  right upper lobe.    New infiltrate developing in the left lower lobe.      Impression:       Persistent consolidation and volume loss right lower  lobe.  No change consolidation infiltrate peripherally in the right  upper lobe.    No change in moderate-sized right pleural effusion.    New Infiltrate developing left lower lobe.    Increasing pulmonary vascular congestion.    [id number]    Electronically signed by:  Félix Nicolas MD  9/12/2018 9:13  PM CDT Workstation: [id number]          Assessment/Plan     Principal Problem:    Pleural effusion  Active Problems:    Pneumonia of right upper lobe due to infectious organism (CMS/HCC)    Sepsis (CMS/HCC)    Hypertension    Pulmonary hypertension    SUMMER (acute kidney injury) (CMS/HCC)    His clinical condition improved to some extent after 1,600 cc bloody fluid was evacuated on thoracocentesis. Continue with the nebulized treatments, CPAP/BiPAP, add IV Lasix.    His cardiac Echo showed tricuspid regurgitation and pulmonary hypertension.    Ariel Moreland MD  09/13/18  7:11 PM

## 2018-09-13 NOTE — PLAN OF CARE
Problem: Patient Care Overview  Goal: Plan of Care Review  Outcome: Ongoing (interventions implemented as appropriate)   09/13/18 8939   Coping/Psychosocial   Plan of Care Reviewed With patient;spouse;family   Plan of Care Review   Progress improving   OTHER   Outcome Summary U/S guided thoracentesis done - tolerated well, 1600 mL removed, pt ON 6l NC - was on CPAP        Problem: Sepsis/Septic Shock (Adult)  Goal: Signs and Symptoms of Listed Potential Problems Will be Absent, Minimized or Managed (Sepsis/Septic Shock)  Outcome: Ongoing (interventions implemented as appropriate)      Problem: Breathing Pattern Ineffective (Adult)  Goal: Effective Oxygenation/Ventilation  Outcome: Ongoing (interventions implemented as appropriate)    Goal: Anxiety/Fear Reduction  Outcome: Ongoing (interventions implemented as appropriate)      Problem: Fall Risk (Adult)  Goal: Identify Related Risk Factors and Signs and Symptoms  Outcome: Outcome(s) achieved Date Met: 09/13/18    Goal: Absence of Fall  Outcome: Ongoing (interventions implemented as appropriate)      Problem: Surgery Nonspecified (Adult)  Goal: Signs and Symptoms of Listed Potential Problems Will be Absent, Minimized or Managed (Surgery Nonspecified)  Outcome: Ongoing (interventions implemented as appropriate)    Goal: Anesthesia/Sedation Recovery  Outcome: Ongoing (interventions implemented as appropriate)

## 2018-09-13 NOTE — PLAN OF CARE
Problem: Patient Care Overview  Goal: Plan of Care Review  Outcome: Ongoing (interventions implemented as appropriate)    Goal: Individualization and Mutuality  Outcome: Ongoing (interventions implemented as appropriate)    Goal: Discharge Needs Assessment  Outcome: Ongoing (interventions implemented as appropriate)      Problem: Sepsis/Septic Shock (Adult)  Goal: Signs and Symptoms of Listed Potential Problems Will be Absent, Minimized or Managed (Sepsis/Septic Shock)  Outcome: Ongoing (interventions implemented as appropriate)      Problem: Breathing Pattern Ineffective (Adult)  Goal: Effective Oxygenation/Ventilation  Outcome: Ongoing (interventions implemented as appropriate)    Goal: Anxiety/Fear Reduction  Outcome: Ongoing (interventions implemented as appropriate)      Problem: Pneumonia (Adult)  Goal: Signs and Symptoms of Listed Potential Problems Will be Absent, Minimized or Managed (Pneumonia)  Outcome: Ongoing (interventions implemented as appropriate)      Problem: Fall Risk (Adult)  Goal: Identify Related Risk Factors and Signs and Symptoms  Outcome: Ongoing (interventions implemented as appropriate)    Goal: Absence of Fall  Outcome: Ongoing (interventions implemented as appropriate)

## 2018-09-13 NOTE — NURSING NOTE
Patient's blood pressure was 200/88.  Patient seemed anxious. O2 saturation was 86% on 15L non-rebreather and having labored breathing.  Dr. Moreland was called to notify him of the changes.  The physician decided to transfer the patient to CCU.  Patient was awaiting thoracentesis.

## 2018-09-14 NOTE — CONSULTS
"    CRITICAL CARE CONSULT NOTE  Dasha Pierce MD    [facility] CRITICAL CARE  9/14/2018        Trae De La Paz  83 y.o. male  1935  [id number]            Requesting physician: Ariel Moreland MD    Reason for Consultation:  Acute hypoxemic respiratory failure    CC: \"I was gonna die\"    Subjective     History of Present Illness:  Trae De La Paz is a 83 y.o. male with PMH significant for possible COPD, past tobacco use, HTN, and BPH who was admitted on 9/11/18 with acute hypoxemic respiratory failure.  The patient was initially thought to have kidney card pneumonia and was started on empiric antibiotics as well as supplemental oxygen.  Chest imaging did reveal a large right pleural effusion.  He did undergo a CT PA out of concern for possible PE as well as anomalous pulmonary vein, which was primarily significant for his pleural effusion as well as adjacent atelectasis and fluid congestion.  He had worsening respiratory status yesterday and underwent an urgent thoracentesis with removal of the shin 100 cc of fluid.  Pleural fluid analysis with an exudate.  Patient was placed on BiPAP and transferred to the ICU.  He was initially on 6 L nasal cannula prior to transfer but his saturations were in the mid 80s.  The patient was maintained on BiPAP overnight, and I was consulted today to assist with management.  Currently, the patient is off BiPAP and on 6 L nasal cannula.  His blood pressure has been mildly elevated.  He states that he was in his usual state of health until a few days prior to admission when he began having dyspnea on exertion.  The shortness of breath persisted and got to the point where he was having difficulty rest so he came to the ED.  He did take a road trip over a month ago but did not have any difficulties following his return.  He states that he [facility] and has been prescribed Symbicort which she does not use " regularly, as well as albuterol.  He does not perceive much benefit from either bronchodilator.  He denies any cough, sputum, or fever.  Patient was having pain in his right side which improved following his thoracentesis.  He does feel congested, but states it seems to be on his left side.  He previously smoked 1 pack a day for 15-20 years, but he quit over 40 years ago.  He primarily worked as a , but he did serve in the  and spend time overseas.  He reports a known exposure to chemicals during his time in the .  His parents  with hypertension and stroke.    Review of Systems:   Review of Systems   Constitutional: Positive for fatigue. Negative for fever and unexpected weight change.   HENT: Negative for congestion.    Respiratory: Positive for cough and shortness of breath. Negative for wheezing.    Cardiovascular: Positive for chest pain. Negative for leg swelling.   Gastrointestinal: Negative for abdominal pain.   Neurological: Positive for weakness.   Psychiatric/Behavioral: Positive for confusion.       All systems were reviewed and negative except as noted above in the HPI.    Home Meds:  Prescriptions Prior to Admission   Medication Sig Dispense Refill Last Dose   • albuterol (PROVENTIL HFA;VENTOLIN HFA) 108 (90 Base) MCG/ACT inhaler Inhale 2 puffs Every 4 (Four) Hours As Needed for Wheezing.   2018 at Unknown time   • amLODIPine (NORVASC) 5 MG tablet Take 5 mg by mouth Daily.   2018 at Unknown time   • tamsulosin (FLOMAX) 0.4 MG capsule 24 hr capsule Take 1 capsule by mouth Every Night.   2018 at Unknown time       Inpatient Meds:    Current Facility-Administered Medications:   •  acetaminophen (TYLENOL) tablet 650 mg, 650 mg, Oral, Q4H PRN, Ho Blackmon MD, 650 mg at 18 8177  •  albuterol (PROVENTIL) nebulizer solution 0.083% 2.5 mg/3mL, 2.5 mg, Nebulization, Q4H PRN, Dasha Pierce MD  •  amLODIPine (NORVASC) tablet 5 mg, 5 mg, Oral, Daily,  Ho Blackmon MD, 5 mg at 09/12/18 0932  •  budesonide-formoterol (SYMBICORT) 160-4.5 MCG/ACT inhaler 2 puff, 2 puff, Inhalation, BID - RT, Dasha Pierce MD  •  cefTRIAXone (ROCEPHIN) 1 g/100 mL 0.9% NS (MBP), 1 g, Intravenous, Q24H, Ho Blackmon MD, 1 g at 09/13/18 0636  •  doxycycline (VIBRAMYCIN) 100 mg/250 mL 0.9% NS VTB, 100 mg, Intravenous, Q12H, Ho Blackmon MD, 100 mg at 09/13/18 2233  •  furosemide (LASIX) injection 20 mg, 20 mg, Intravenous, Q12H, Ariel Moreland MD, 20 mg at 09/13/18 2159  •  Hold medication, 1 each, Does not apply, Continuous PRN, Ariel Moreland MD  •  ipratropium-albuterol (DUO-NEB) nebulizer solution 3 mL, 3 mL, Nebulization, 4x Daily - RT, Dasha Pierce MD  •  LORazepam (ATIVAN) injection 0.5 mg, 0.5 mg, Intravenous, Q6H PRN, Ariel Moreland MD, 0.5 mg at 09/13/18 1208  •  ondansetron (ZOFRAN) injection 4 mg, 4 mg, Intravenous, Q6H PRN, Ho Blackmon MD, 4 mg at 09/13/18 0558  •  predniSONE (DELTASONE) tablet 40 mg, 40 mg, Oral, Daily With Breakfast, Dasha Pierce MD  •  sodium chloride 0.9 % flush 1-10 mL, 1-10 mL, Intravenous, PRN, Ho Blackmon MD  •  Insert peripheral IV, , , Once **AND** sodium chloride 0.9 % flush 10 mL, 10 mL, Intravenous, PRN, Guillermo Stokes DO, 10 mL at 09/13/18 1700  •  tamsulosin (FLOMAX) 24 hr capsule 0.4 mg, 0.4 mg, Oral, Nightly, Ho Blakcmon MD, 0.4 mg at 09/13/18 2159    Allergies:  Allergies   Allergen Reactions   • Iodine Hives       Past Medical History:  Past Medical History:   Diagnosis Date   • Hypertension        Past Surgical History:  Past Surgical History:   Procedure Laterality Date   • HERNIA REPAIR          Social History:   Social History     Social History   • Marital status:      Spouse name: N/A   • Number of children: N/A   • Years of education: N/A     Occupational History   • Not on file.     Social History Main Topics   • Smoking status: Current Some Day Smoker   • Smokeless tobacco: Not on file       Comment: pt quit 40 years ago   • Alcohol use Yes      Comment: occasionaly   • Drug use: Unknown   • Sexual activity: Defer     Other Topics Concern   • Not on file     Social History Narrative   • No narrative on file       Family History:  History reviewed. No pertinent family history.    Objective     Vital Sign Min/Max for last 24 hours:  Temp  Min: 98.2 °F (36.8 °C)  Max: 98.6 °F (37 °C)   BP  Min: 116/56  Max: 200/88   Pulse  Min: 87  Max: 121   Resp  Min: 16  Max: 31   SpO2  Min: 87 %  Max: 98 %   Flow (L/min)  Min: 6  Max: 15   Weight  Min: 76.2 kg (167 lb 15.9 oz)  Max: 78.1 kg (172 lb 2.9 oz)     Physical Exam:  98.4 °F (36.9 °C) (Temporal Artery ) 105 150/66 24 91% 76.2 kg (167 lb 15.9 oz) Body mass index is 25.54 kg/m².  Physical Exam   Constitutional: He is oriented to person, place, and time. Vital signs are normal. He appears well-developed and well-nourished.   HENT:   Head: Normocephalic and atraumatic.   Nose: Nose normal.   Mouth/Throat: Oropharynx is clear and moist and mucous membranes are normal.   Mallampati 3, edentulous   Eyes: Conjunctivae, EOM and lids are normal.   Neck: Trachea normal. Neck supple. No thyroid mass present.   Cardiovascular: Regular rhythm and normal heart sounds.  Tachycardia present.  PMI is not displaced.  Exam reveals no gallop.    No murmur heard.  Hyperdynamic precordium   Pulmonary/Chest: Effort normal. Tachypnea noted. He has decreased breath sounds in the right lower field. He has wheezes (L>R). He has no rhonchi. He has no rales.   Abdominal: Soft. Normal appearance and bowel sounds are normal. There is no hepatomegaly. There is no tenderness.   Musculoskeletal:   No extremity edema     Vascular Status -  His right foot exhibits no edema. His left foot exhibits no edema.  Lymphadenopathy:        Head (right side): No submandibular adenopathy present.        Head (left side): No submandibular adenopathy present.     He has no cervical adenopathy.        Right:  No supraclavicular adenopathy present.        Left: No supraclavicular adenopathy present.   Neurological: He is alert and oriented to person, place, and time.   Skin: Skin is warm and dry. No cyanosis. Nails show no clubbing.   Psychiatric: He has a normal mood and affect. His behavior is normal. Judgment normal. Cognition and memory are normal.       Central Lines/PICC: absent    Data Review-   Labs: I personally reviewed the latest laboratory results.  Lab Results (last 24 hours)     Procedure Component Value Units Date/Time    Body Fluid Culture - Body Fluid, Pleural Cavity [[id number]]  (Normal) Collected:  09/13/18 1425    Specimen:  Body Fluid from Pleural Cavity Updated:  09/14/18 0714     BF Culture No growth at 24 hours     Gram Stain Result Few (2+) WBCs seen      No organisms seen    Non-gynecologic Cytology [[id number]] Collected:  09/13/18 1359    Specimen:  Body Fluid from Pleural Cavity Updated:  09/14/18 0714    Blood Culture - Blood, [[id number]]  (Normal) Collected:  09/12/18 0603    Specimen:  Blood from Arm, Left Updated:  09/14/18 0615     Blood Culture No growth at 2 days    Basic Metabolic Panel [[id number]]  (Abnormal) Collected:  09/14/18 0331    Specimen:  Blood Updated:  09/14/18 0509     Glucose 136 (H) mg/dL      BUN 27 (H) mg/dL      Creatinine 0.85 mg/dL      Sodium 139 mmol/L      Potassium 4.5 mmol/L      Chloride 105 mmol/L      CO2 26.0 mmol/L      Calcium 8.9 mg/dL      eGFR Non African Amer 87 mL/min/1.73      BUN/Creatinine Ratio 31.8 (H)     Anion Gap 8.0 mmol/L     Narrative:       The MDRD GFR formula is only valid for adults with stable renal function between ages 18 and 70.    CBC & Differential [[id number]] Collected:  09/14/18 0331    Specimen:  Blood Updated:  09/14/18 0357    Narrative:       The following orders were created for panel order CBC & Differential.  Procedure                               Abnormality         Status                     ---------                                -----------         ------                     CBC Auto Differential[[id number]]        Abnormal            Final result                 Please view results for these tests on the individual orders.    CBC Auto Differential [[id number]]  (Abnormal) Collected:  09/14/18 0331    Specimen:  Blood Updated:  09/14/18 0357     WBC 25.94 (H) 10*3/mm3      RBC 4.72 10*6/mm3      Hemoglobin 14.2 g/dL      Hematocrit 42.1 %      MCV 89.2 fL      MCH 30.1 pg      MCHC 33.7 g/dL      RDW 14.7 (H) %      RDW-SD 48.0 (H) fl      MPV 10.7 fL      Platelets 287 10*3/mm3      Neutrophil % 88.8 (H) %      Lymphocyte % 6.4 (L) %      Monocyte % 4.1 %      Eosinophil % 0.1 %      Basophil % 0.1 %      Immature Grans % 0.5 %      Neutrophils, Absolute 23.05 (H) 10*3/mm3      Lymphocytes, Absolute 1.65 10*3/mm3      Monocytes, Absolute 1.06 (H) 10*3/mm3      Eosinophils, Absolute 0.02 10*3/mm3      Basophils, Absolute 0.02 10*3/mm3      Immature Grans, Absolute 0.14 (H) 10*3/mm3      nRBC 0.0 /100 WBC     Blood Culture - Blood, [[id number]]  (Normal) Collected:  09/12/18 0305    Specimen:  Blood from Arm, Left Updated:  09/14/18 0315     Blood Culture No growth at 2 days    Respiratory Panel, PCR - Swab, Nasopharynx [[id number]]  (Normal) Collected:  09/13/18 1951    Specimen:  Swab from Nasopharynx Updated:  09/13/18 2224     ADENOVIRUS, PCR Not Detected     Coronavirus 229E Not Detected     Coronavirus HKU1 Not Detected     Coronavirus NL63 Not Detected     Coronavirus OC43 Not Detected     Human Metapneumovirus Not Detected     Human Rhinovirus/Enterovirus Not Detected     Influenza B PCR Not Detected     Parainfluenza Virus 1 Not Detected     Parainfluenza Virus 2 Not Detected     Parainfluenza Virus 3 Not Detected     Parainfluenza Virus 4 Not Detected     Bordetella pertussis pcr Not Detected     Influenza A H1 2009 PCR Not Detected     Chlamydophila pneumoniae PCR Not Detected     Mycoplasma pneumo by PCR Not Detected      Influenza A PCR Not Detected     Influenza A H3 Not Detected     Influenza A H1 Not Detected     RSV, PCR Not Detected    Body Fluid Cell Count With Differential - Body Fluid, Pleural Cavity [[id number]] Collected:  09/13/18 1359    Specimen:  Body Fluid from Pleural Cavity Updated:  09/13/18 1548    Narrative:       The following orders were created for panel order Body Fluid Cell Count With Differential - Body Fluid, Pleural Cavity.  Procedure                               Abnormality         Status                     ---------                               -----------         ------                     Body fluid cell count - ...[[id number]]  Abnormal            Final result               Body fluid differential ...[[id number]]                      Final result                 Please view results for these tests on the individual orders.    Body fluid differential - Body Fluid, [[id number]] Collected:  09/13/18 1359    Specimen:  Body Fluid from Pleural Cavity Updated:  09/13/18 1548     Neutrophils, Fluid 35 %      Mononuclear, Fluid 65 %     Protein, Body Fluid - Pleural Fluid, Pleural Cavity [[id number]] Collected:  09/13/18 1359    Specimen:  Pleural Fluid from Pleural Cavity Updated:  09/13/18 1510     Protein, Total, Fluid 5.0 g/dL     Lactate Dehydrogenase, Body Fluid - Body Fluid, Pleural Cavity [[id number]] Collected:  09/13/18 1359    Specimen:  Pleural Fluid from Pleural Cavity Updated:  09/13/18 1510     Lactate Dehydrogenase (LD), Fluid 1,150 U/L     Body fluid cell count - Body Fluid, [[id number]]  (Abnormal) Collected:  09/13/18 1359    Specimen:  Body Fluid from Pleural Cavity Updated:  09/13/18 1457     WBC, Fluid 1,162.5 (H) /mm3      RBC, Fluid 20,500 (H) /mm3      Color, Fluid Red     Appearance, Fluid Slightly Cloudy (A)     Volume, Fluid 1,650.0 mL     Blood Gas, Arterial [[id number]]  (Abnormal) Collected:  09/13/18 1425    Specimen:  Arterial Blood Updated:  09/13/18 1446     Site Right Radial      Roverto's Test N/A     pH, Arterial 7.389 pH units      pCO2, Arterial 43.2 mm Hg      pO2, Arterial 157.0 (H) mm Hg      HCO3, Arterial 26.1 (H) mmol/L      Base Excess, Arterial 0.7 mmol/L      O2 Saturation, Arterial 99.4 (H) %      Barometric Pressure for Blood Gas 750 mmHg      Modality CPAP     FIO2 100 %      Ventilator Mode NA     CPAP 8.0 cmH2O      Collected by dl    Extra Tubes [[id number]] Collected:  09/13/18 1154    Specimen:  Blood from Blood, Venous Line Updated:  09/13/18 1300    Narrative:       The following orders were created for panel order Extra Tubes.  Procedure                               Abnormality         Status                     ---------                               -----------         ------                     Light Blue Top[[id number]]                                   Final result               Lavender Top[[id number]]                                     Final result               Gold Top - SST[[id number]]                                   Final result                 Please view results for these tests on the individual orders.    Lavender Top [[id number]] Collected:  09/13/18 1155    Specimen:  Blood Updated:  09/13/18 1300     Extra Tube hold for add-on     Comment: Auto resulted       Gold Top - SST [[id number]] Collected:  09/13/18 1157    Specimen:  Blood Updated:  09/13/18 1300     Extra Tube Hold for add-ons.     Comment: Auto resulted.       Light Blue Top [[id number]] Collected:  09/13/18 1154    Specimen:  Blood Updated:  09/13/18 1300     Extra Tube hold for add-on     Comment: Auto resulted       SCANNED - LABS [[id number]] Resulted:  09/11/18      Updated:  09/13/18 1237    Protime-INR [[id number]]  (Normal) Collected:  09/13/18 1154    Specimen:  Blood Updated:  09/13/18 1220     Protime 13.2 Seconds      INR 1.02    Narrative:       Therapeutic range for most indications is 2.0-3.0 INR,  or 2.5-3.5 for mechanical heart valves.    Lactic Acid, Plasma [[id number]]  (Normal)  Collected:  09/13/18 1134    Specimen:  Blood Updated:  09/13/18 1201     Lactate 1.5 mmol/L            Imaging: I personally visualized the relevant images of scans/x-rays performed.  Imaging Results (last 24 hours)     Procedure Component Value Units Date/Time    XR Chest 1 View [[id number]] Collected:  09/14/18 0709     Updated:  09/14/18 0739    Narrative:         PROCEDURE: Single chest view portable    REASON FOR EXAM:R pleural effusion, J18.1 Lobar pneumonia,  unspecified organism R09.02 Hypoxemia J90 Pleural effusion, not  elsewhere classified    FINDINGS: Comparison exam dated September 13, 2018. Cardiac size  appears within normal limits. Bilateral perihilar haziness left  side worse than right. Left upper lobe and left lung base  interstitial opacities. Right upper lobe peripheral patchy  opacity. Lungs are otherwise clear. Small right pleural effusion.  No acute osseous abnormality.      Impression:       1.  Bilateral perihilar haziness right worse than left with  associated left upper lobe and left lung base interstitial  opacities. This may represent changes from pulmonary edema and/or  pneumonia.  2.  Right upper lobe peripheral patchy opacity suspicious for  pneumonia.  3.  Small right pleural effusion.    Electronically signed by:  Mohit Ca MD  9/14/2018 7:38 AM CDT  Workstation: [id number]     Thoracentesis [[id number]] Collected:  09/13/18 1348    Specimen:  Body Fluid Updated:  09/13/18 1616    Narrative:         PROCEDURE: Ultrasound guided thoracentesis    HISTORY:  Right pleural effusion    COMPARISON:  none    TECHNIQUE:    Informed consent was obtained following a discussion of the  procedure with the patient, with benefits, alternatives, and with  risks explained as possible bleeding, infection, damage to  adjacent organs and pneumothorax which would require a chest  tube.    An appropriate site was marked in the posterior right hemithorax.  Following initial imaging for localization of an  optimal site of  intervention, the skin was prepped and draped in the usual  sterile fashion.  10 mL of lidocaine was used for local  anesthesia. A 19-gauge Yueh needle and catheter was inserted into  the pleural space under ultrasound guidance. The needle was  removed and fluid was collected into a vacuum bottle.    FINDINGS:  Approximately 1600 mL of serous fluid was collected and submitted  for laboratory evaluation, as requested.    The patient tolerated the procedure well and left the department  in stable condition. No immediate post procedure complications.      Impression:       CONCLUSION:   Successful ultrasound-guided thoracentesis with removal of 1600  mL of serous fluid, as described above.    Electronically signed by:  José Miguel Campbell MD  9/13/2018 4:15 PM CDT  Workstation: [id number]    XR Chest 1 View [[id number]] Collected:  09/13/18 1418     Updated:  09/13/18 1437    Narrative:         EXAM:         Radiograph(s), Chest   VIEWS:   Frontal  ; 1       DATE/TIME:  9/13/2018 2:35 PM CDT                INDICATION:   Post right thoracentesis per Dr. Campbell., J18.1 Lobar  pneumonia, unspecified organism R09.02 Hypoxemia J90 Pleural  effusion, not elsewhere classified    COMPARISON:  CXR: 9/12/18             FINDINGS:             - lines/tubes:    none     - cardiac:         size within normal limits         - mediastinum: contour within normal limits         - lungs:         Bilateral airspace disease, improved since the  previous study.             - pleura:         no evidence of  fluid                  - osseous:         unremarkable for age                  - misc.:         Impression:       CONCLUSION:        1. Improving bilateral airspace disease.                                                             Electronically signed by:  HILLARY Barth MD  9/13/2018 2:36  PM CDT Workstation: [id number]            Assessment/Plan     Assessment:  # Acute hypoxemic respiratory failure  # Large exudative right  pleural effusion  # CAP  # Acute pulmonary edema, L>R  # HTN  # Presumed COPD with exacerbation  # H/o tobacco use  # BPH      Recommendations:  -O2 to keep sats >88%. Can use HF NC up to 15L if needed.  -Bipap prn WOB or refractory hypoxemia  -Start prednisone 40mg PO daily  -Start Symbicort 160 BID and schedule duonebs  -Albuterol prn  -Cont ceftriaxone x 7d  -Stop doxycycline as strep and legionella negative  -Cont lasix 20mg q12hrs   -Cont home norvasc, but may require additional anti-HTN  -Add hydralazine IV prn SBP >180  -Do not advance diet until respiratory status more stable  -PPX: start lovenox and pepcid  -FULL CODE.  Attempted to address CODE STATUS, but the patient states he is uncertain so we will default to full code.    He likely had development of a parapneumonic effusion which shows improvement following thoracentesis, however, he now has asymmetric acute pulmonary edema which is triggering an exacerbation of probable underlying COPD.    D/w RN and RT    Critical care time spent: 43 minutes  This time excludes other billable procedures. Time does include preparation of documents, medical consultations, review of old records, and direct bedside care.       Thank you for allowing me to participate in the care of Trae De La Paz. Please contact me with any questions.       This document has been electronically signed by Dasha Pierce MD on September 14, 2018 8:14 AM      [phone]    Dictated using Dragon

## 2018-09-14 NOTE — PROGRESS NOTES
Discharge Planning Assessment  [facility]     Patient Name: Trae De La Paz  MRN: [id number]  Today's Date: 9/14/2018    Admit Date: 9/11/2018          Discharge Needs Assessment     Row Name 09/14/18 3408       Living Environment    Lives With spouse    Current Living Arrangements home/apartment/condo    Primary Care Provided by self    Provides Primary Care For no one    Family Caregiver if Needed spouse    Quality of Family Relationships helpful;supportive    Able to Return to Prior Arrangements yes    Living Arrangement Comments Pt resides at home with spouse. pt has good support system and was independent prior to hospitalization.        Resource/Environmental Concerns    Resource/Environmental Concerns none    Transportation Concerns car, none       Transition Planning    Patient/Family Anticipates Transition to home    Transportation Anticipated family or friend will provide       Discharge Needs Assessment    Concerns to be Addressed adjustment to diagnosis/illness    Equipment Currently Used at Home none    Anticipated Changes Related to Illness none    Equipment Needed After Discharge oxygen    Discharge Facility/Level of Care Needs home with home health    Current Discharge Risk chronically ill    Discharge Coordination/Progress Pt plans to return home at d/c. Does not anticipate any needs however may benefit from home health and possibly home o2.             Discharge Plan     Row Name 09/14/18 4822       Plan    Plan Comments LSW assesment complete. pt resides at home with spouse. pt has good support system. pt was very independent prior to hospitalization. Pt did not require any DME or home health. Pt's goal is to return home at d/c. He may benefit from home health and possibly home o2. LSW awaiting recomendations from MD and therapy. LSW/case mgt will follow up as consulted and complete arrangements as ordered.         Destination     No service coordination in this encounter.      Durable  Medical Equipment     No service coordination in this encounter.      Dialysis/Infusion     No service coordination in this encounter.      Home Medical Care     No service coordination in this encounter.      Social Care     No service coordination in this encounter.        Expected Discharge Date and Time     Expected Discharge Date Expected Discharge Time    Sep 17, 2018               Demographic Summary     Row Name 09/14/18 1337       General Information    Admission Type inpatient    Referral Source high risk screening    Reason for Consult discharge planning    Preferred Language English     Used During This Interaction no       Contact Information    Contact Information Obtained for             Functional Status     Row Name 09/14/18 1337       Functional Status    Usual Activity Tolerance moderate    Current Activity Tolerance fair       Functional Status, IADL    Medications independent    Meal Preparation independent    Housekeeping independent    Laundry independent    Shopping independent       Mental Status Summary    Recent Changes in Mental Status/Cognitive Functioning no changes            Psychosocial    No documentation.           Abuse/Neglect    No documentation.           Legal    No documentation.           Substance Abuse    No documentation.           Patient Forms    No documentation.         NADIA Anguiano

## 2018-09-14 NOTE — PROGRESS NOTES
Progress Note  Ariel Moreland MD  Hospitalist    Date of visit: 9/14/2018     LOS: 2 days   Patient Care Team:  Félix Tran MD as PCP - General (Family Medicine)    Chief Complaint: worsening dyspnea    Subjective     Interval History:     Patient Complaints: shortness of breath, worse respiratory distress.    History taken from: patient    Medication Review:   Current Facility-Administered Medications   Medication Dose Route Frequency Provider Last Rate Last Dose   • acetaminophen (TYLENOL) tablet 650 mg  650 mg Oral Q4H PRN Ho Blackmon MD   650 mg at 09/14/18 0937   • albuterol (PROVENTIL) nebulizer solution 0.083% 2.5 mg/3mL  2.5 mg Nebulization Q4H PRN Dasha Pierce MD       • amLODIPine (NORVASC) tablet 5 mg  5 mg Oral Daily Ho Blackmon MD   5 mg at 09/14/18 0821   • budesonide-formoterol (SYMBICORT) 160-4.5 MCG/ACT inhaler 2 puff  2 puff Inhalation BID - RT Dasha Pierce MD       • cefTRIAXone (ROCEPHIN) 1 g/100 mL 0.9% NS (MBP)  1 g Intravenous Q24H Ho Blackmon MD   1 g at 09/14/18 0825   • enoxaparin (LOVENOX) syringe 40 mg  40 mg Subcutaneous Q24H Dasha Pierce MD   40 mg at 09/14/18 0932   • famotidine (PEPCID) tablet 20 mg  20 mg Oral Daily Dasha Pierce MD   20 mg at 09/14/18 0932   • furosemide (LASIX) injection 20 mg  20 mg Intravenous Q12H Ariel Moreland MD   20 mg at 09/14/18 0821   • Hold medication  1 each Does not apply Continuous PRN Ariel Moreland MD       • hydrALAZINE (APRESOLINE) injection 10 mg  10 mg Intravenous Q6H PRN Dasha Pierce MD       • ipratropium-albuterol (DUO-NEB) nebulizer solution 3 mL  3 mL Nebulization 4x Daily - RT Dasha Pierce MD   3 mL at 09/14/18 1600   • LORazepam (ATIVAN) injection 0.5 mg  0.5 mg Intravenous Q6H PRN Ariel Moreland MD   0.5 mg at 09/13/18 1208   • ondansetron (ZOFRAN) injection 4 mg  4 mg Intravenous Q6H PRN Ho Blackmon MD   4 mg at 09/13/18 0590   • predniSONE (DELTASONE) tablet 40 mg  40 mg Oral Daily With  Breakfast Dasha Pierce MD   40 mg at 09/14/18 0932   • sodium chloride 0.9 % flush 1-10 mL  1-10 mL Intravenous PRN Ho Blackmon MD       • sodium chloride 0.9 % flush 10 mL  10 mL Intravenous PRN Guillermo Stokes DO   10 mL at 09/13/18 1700   • tamsulosin (FLOMAX) 24 hr capsule 0.4 mg  0.4 mg Oral Nightly Ho Blackmon MD   0.4 mg at 09/13/18 2159   • traZODone (DESYREL) half tablet 25 mg  25 mg Oral Nightly PRN Ariel Moreland MD           Review of Systems:   Review of Systems   Constitutional: Positive for fatigue.   Respiratory: Positive for cough and shortness of breath.    Cardiovascular: Negative for chest pain, palpitations and leg swelling.   Gastrointestinal: Negative for abdominal distention, abdominal pain, diarrhea and vomiting.   Genitourinary: Negative for discharge, frequency and urgency.   Musculoskeletal: Positive for back pain. Negative for arthralgias and gait problem.   Skin: Positive for pallor.   Neurological: Positive for weakness. Negative for seizures, syncope, light-headedness and headaches.   Psychiatric/Behavioral: Negative for agitation, behavioral problems and confusion.   All other systems reviewed and are negative.      Objective     Vital Signs  Temp:  [97.7 °F (36.5 °C)-98.6 °F (37 °C)] 97.8 °F (36.6 °C)  Heart Rate:  [] 110  Resp:  [16-28] 22  BP: (116-164)/(56-73) 150/67  FiO2 (%):  [70 %] 70 %    Physical Exam:  Physical Exam   Constitutional: He is oriented to person, place, and time. He appears well-developed and well-nourished. He appears ill. He appears distressed.   HENT:   Head: Normocephalic and atraumatic.   Eyes: Pupils are equal, round, and reactive to light. EOM are normal. No scleral icterus.   Neck: Normal range of motion. Neck supple.   Cardiovascular: Regular rhythm.  Tachycardia present.    Pulmonary/Chest: He is in respiratory distress. He has wheezes. He has rales.   Decreased air entry R chest   Abdominal: Soft. Bowel sounds are normal. He  exhibits no distension. There is no tenderness.   Musculoskeletal: Normal range of motion. He exhibits no edema.   Neurological: He is alert and oriented to person, place, and time. No cranial nerve deficit.   Skin: Skin is warm and dry. There is pallor.   Psychiatric: He has a normal mood and affect. His behavior is normal.   Vitals reviewed.       Results Review:    Lab Results (last 24 hours)     Procedure Component Value Units Date/Time    Body Fluid Culture - Body Fluid, Pleural Cavity [[id number]]  (Normal) Collected:  09/13/18 1425    Specimen:  Body Fluid from Pleural Cavity Updated:  09/14/18 0714     BF Culture No growth at 24 hours     Gram Stain Result Few (2+) WBCs seen      No organisms seen    Non-gynecologic Cytology [[id number]] Collected:  09/13/18 1359    Specimen:  Body Fluid from Pleural Cavity Updated:  09/14/18 0714    Blood Culture - Blood, [[id number]]  (Normal) Collected:  09/12/18 0603    Specimen:  Blood from Arm, Left Updated:  09/14/18 0615     Blood Culture No growth at 2 days    Basic Metabolic Panel [[id number]]  (Abnormal) Collected:  09/14/18 0331    Specimen:  Blood Updated:  09/14/18 0509     Glucose 136 (H) mg/dL      BUN 27 (H) mg/dL      Creatinine 0.85 mg/dL      Sodium 139 mmol/L      Potassium 4.5 mmol/L      Chloride 105 mmol/L      CO2 26.0 mmol/L      Calcium 8.9 mg/dL      eGFR Non African Amer 87 mL/min/1.73      BUN/Creatinine Ratio 31.8 (H)     Anion Gap 8.0 mmol/L     Narrative:       The MDRD GFR formula is only valid for adults with stable renal function between ages 18 and 70.    CBC & Differential [[id number]] Collected:  09/14/18 0331    Specimen:  Blood Updated:  09/14/18 0357    Narrative:       The following orders were created for panel order CBC & Differential.  Procedure                               Abnormality         Status                     ---------                               -----------         ------                     CBC Auto  Differential[[id number]]        Abnormal            Final result                 Please view results for these tests on the individual orders.    CBC Auto Differential [[id number]]  (Abnormal) Collected:  09/14/18 0331    Specimen:  Blood Updated:  09/14/18 0357     WBC 25.94 (H) 10*3/mm3      RBC 4.72 10*6/mm3      Hemoglobin 14.2 g/dL      Hematocrit 42.1 %      MCV 89.2 fL      MCH 30.1 pg      MCHC 33.7 g/dL      RDW 14.7 (H) %      RDW-SD 48.0 (H) fl      MPV 10.7 fL      Platelets 287 10*3/mm3      Neutrophil % 88.8 (H) %      Lymphocyte % 6.4 (L) %      Monocyte % 4.1 %      Eosinophil % 0.1 %      Basophil % 0.1 %      Immature Grans % 0.5 %      Neutrophils, Absolute 23.05 (H) 10*3/mm3      Lymphocytes, Absolute 1.65 10*3/mm3      Monocytes, Absolute 1.06 (H) 10*3/mm3      Eosinophils, Absolute 0.02 10*3/mm3      Basophils, Absolute 0.02 10*3/mm3      Immature Grans, Absolute 0.14 (H) 10*3/mm3      nRBC 0.0 /100 WBC     Blood Culture - Blood, [[id number]]  (Normal) Collected:  09/12/18 0305    Specimen:  Blood from Arm, Left Updated:  09/14/18 0315     Blood Culture No growth at 2 days    Respiratory Panel, PCR - Swab, Nasopharynx [[id number]]  (Normal) Collected:  09/13/18 1951    Specimen:  Swab from Nasopharynx Updated:  09/13/18 2224     ADENOVIRUS, PCR Not Detected     Coronavirus 229E Not Detected     Coronavirus HKU1 Not Detected     Coronavirus NL63 Not Detected     Coronavirus OC43 Not Detected     Human Metapneumovirus Not Detected     Human Rhinovirus/Enterovirus Not Detected     Influenza B PCR Not Detected     Parainfluenza Virus 1 Not Detected     Parainfluenza Virus 2 Not Detected     Parainfluenza Virus 3 Not Detected     Parainfluenza Virus 4 Not Detected     Bordetella pertussis pcr Not Detected     Influenza A H1 2009 PCR Not Detected     Chlamydophila pneumoniae PCR Not Detected     Mycoplasma pneumo by PCR Not Detected     Influenza A PCR Not Detected     Influenza A H3 Not Detected      Influenza A H1 Not Detected     RSV, PCR Not Detected          Imaging Results (last 24 hours)     Procedure Component Value Units Date/Time    XR Chest 1 View [[id number]] Collected:  09/14/18 0709     Updated:  09/14/18 0739    Narrative:         PROCEDURE: Single chest view portable    REASON FOR EXAM:R pleural effusion, J18.1 Lobar pneumonia,  unspecified organism R09.02 Hypoxemia J90 Pleural effusion, not  elsewhere classified    FINDINGS: Comparison exam dated September 13, 2018. Cardiac size  appears within normal limits. Bilateral perihilar haziness left  side worse than right. Left upper lobe and left lung base  interstitial opacities. Right upper lobe peripheral patchy  opacity. Lungs are otherwise clear. Small right pleural effusion.  No acute osseous abnormality.      Impression:       1.  Bilateral perihilar haziness right worse than left with  associated left upper lobe and left lung base interstitial  opacities. This may represent changes from pulmonary edema and/or  pneumonia.  2.  Right upper lobe peripheral patchy opacity suspicious for  pneumonia.  3.  Small right pleural effusion.    Electronically signed by:  Mohit Ca MD  9/14/2018 7:38 AM CDT  Workstation: [id number]          Assessment/Plan     Principal Problem:    Pleural effusion  Active Problems:    Pneumonia of right upper lobe due to infectious organism (CMS/HCC)    Sepsis (CMS/HCC)    Hypertension    Pulmonary hypertension    SUMMER (acute kidney injury) (CMS/HCC)    His clinical condition improved to some extent after 1,600 cc bloody fluid was evacuated on thoracocentesis. Continue with the nebulized treatments, CPAP/BiPAP, add IV Lasix.    His cardiac Echo showed tricuspid regurgitation and pulmonary hypertension.    Ariel Moreland MD  09/14/18  5:48 PM

## 2018-09-14 NOTE — PLAN OF CARE
Problem: Patient Care Overview  Goal: Plan of Care Review  Outcome: Ongoing (interventions implemented as appropriate)   09/14/18 1001   Coping/Psychosocial   Plan of Care Reviewed With patient;spouse;family   OTHER   Outcome Summary prn med ordered for nightly cpap use, otherwise pt has been doing well on high flow nc throughout day. pt still has non productive cough but lung sounds have improved through out shift. pt still complains of sob when doing sctivity and dsats to mid to low 80s but recovers with rest. says he feels better today than yesterday.     Goal: Discharge Needs Assessment  Outcome: Ongoing (interventions implemented as appropriate)    Goal: Interprofessional Rounds/Family Conf  Outcome: Ongoing (interventions implemented as appropriate)      Problem: Sepsis/Septic Shock (Adult)  Goal: Signs and Symptoms of Listed Potential Problems Will be Absent, Minimized or Managed (Sepsis/Septic Shock)  Outcome: Ongoing (interventions implemented as appropriate)      Problem: Breathing Pattern Ineffective (Adult)  Goal: Effective Oxygenation/Ventilation  Outcome: Ongoing (interventions implemented as appropriate)    Goal: Anxiety/Fear Reduction  Outcome: Ongoing (interventions implemented as appropriate)      Problem: Pneumonia (Adult)  Goal: Signs and Symptoms of Listed Potential Problems Will be Absent, Minimized or Managed (Pneumonia)  Outcome: Ongoing (interventions implemented as appropriate)      Problem: Fall Risk (Adult)  Goal: Absence of Fall  Outcome: Ongoing (interventions implemented as appropriate)      Problem: Surgery Nonspecified (Adult)  Goal: Signs and Symptoms of Listed Potential Problems Will be Absent, Minimized or Managed (Surgery Nonspecified)  Outcome: Ongoing (interventions implemented as appropriate)    Goal: Anesthesia/Sedation Recovery  Outcome: Ongoing (interventions implemented as appropriate)      Problem: Skin Injury Risk (Adult)  Goal: Identify Related Risk Factors and Signs  and Symptoms  Outcome: Ongoing (interventions implemented as appropriate)    Goal: Skin Health and Integrity  Outcome: Ongoing (interventions implemented as appropriate)

## 2018-09-14 NOTE — PLAN OF CARE
Problem: Patient Care Overview  Goal: Plan of Care Review  Outcome: Ongoing (interventions implemented as appropriate)   09/14/18 0431   Coping/Psychosocial   Plan of Care Reviewed With patient   Plan of Care Review   Progress improving   OTHER   Outcome Summary pt rested well during night. pt slightly tachypneic during night. encouraging pt to move and slow breathing. pt tolerating treatment at time.      Goal: Individualization and Mutuality  Outcome: Ongoing (interventions implemented as appropriate)    Goal: Discharge Needs Assessment  Outcome: Ongoing (interventions implemented as appropriate)    Goal: Interprofessional Rounds/Family Conf  Outcome: Ongoing (interventions implemented as appropriate)      Problem: Sepsis/Septic Shock (Adult)  Goal: Signs and Symptoms of Listed Potential Problems Will be Absent, Minimized or Managed (Sepsis/Septic Shock)  Outcome: Ongoing (interventions implemented as appropriate)      Problem: Breathing Pattern Ineffective (Adult)  Goal: Effective Oxygenation/Ventilation  Outcome: Ongoing (interventions implemented as appropriate)    Goal: Anxiety/Fear Reduction  Outcome: Ongoing (interventions implemented as appropriate)      Problem: Pneumonia (Adult)  Goal: Signs and Symptoms of Listed Potential Problems Will be Absent, Minimized or Managed (Pneumonia)  Outcome: Ongoing (interventions implemented as appropriate)      Problem: Fall Risk (Adult)  Goal: Absence of Fall  Outcome: Ongoing (interventions implemented as appropriate)      Problem: Surgery Nonspecified (Adult)  Goal: Signs and Symptoms of Listed Potential Problems Will be Absent, Minimized or Managed (Surgery Nonspecified)  Outcome: Ongoing (interventions implemented as appropriate)    Goal: Anesthesia/Sedation Recovery  Outcome: Ongoing (interventions implemented as appropriate)

## 2018-09-15 NOTE — PLAN OF CARE
Problem: Patient Care Overview  Goal: Plan of Care Review  Outcome: Ongoing (interventions implemented as appropriate)   09/15/18 2195   Coping/Psychosocial   Plan of Care Reviewed With spouse;family   Plan of Care Review   Progress no change   OTHER   Outcome Summary patient was going to be transfereed up stairs see significant event note since pt given prn ativan and placed back on cpap vs stable at the time of significant event pt learned grandson was in er coding once pt learned emely didnt make it anxiety worsened so ativan given prn pt is now resting well       Problem: Sepsis/Septic Shock (Adult)  Goal: Signs and Symptoms of Listed Potential Problems Will be Absent, Minimized or Managed (Sepsis/Septic Shock)  Outcome: Ongoing (interventions implemented as appropriate)      Problem: Breathing Pattern Ineffective (Adult)  Goal: Effective Oxygenation/Ventilation  Outcome: Ongoing (interventions implemented as appropriate)    Goal: Anxiety/Fear Reduction  Outcome: Ongoing (interventions implemented as appropriate)      Problem: Pneumonia (Adult)  Goal: Signs and Symptoms of Listed Potential Problems Will be Absent, Minimized or Managed (Pneumonia)  Outcome: Ongoing (interventions implemented as appropriate)      Problem: Fall Risk (Adult)  Goal: Absence of Fall  Outcome: Ongoing (interventions implemented as appropriate)      Problem: Surgery Nonspecified (Adult)  Goal: Signs and Symptoms of Listed Potential Problems Will be Absent, Minimized or Managed (Surgery Nonspecified)  Outcome: Ongoing (interventions implemented as appropriate)    Goal: Anesthesia/Sedation Recovery  Outcome: Ongoing (interventions implemented as appropriate)      Problem: Skin Injury Risk (Adult)  Goal: Identify Related Risk Factors and Signs and Symptoms  Outcome: Ongoing (interventions implemented as appropriate)    Goal: Skin Health and Integrity  Outcome: Ongoing (interventions implemented as appropriate)

## 2018-09-15 NOTE — PROGRESS NOTES
Progress Note  Ariel Moreland MD  Hospitalist    Date of visit: 9/15/2018     LOS: 3 days   Patient Care Team:  Félix Tran MD as PCP - General (Family Medicine)    Chief Complaint: dyspnea    Subjective     Interval History:     Patient Complaints: shortness of breath, respiratory distress - improved    History taken from: patient    Medication Review:   Current Facility-Administered Medications   Medication Dose Route Frequency Provider Last Rate Last Dose   • acetaminophen (TYLENOL) tablet 650 mg  650 mg Oral Q4H PRN Ho Blackmon MD   650 mg at 09/15/18 0546   • albuterol (PROVENTIL) nebulizer solution 0.083% 2.5 mg/3mL  2.5 mg Nebulization Q4H PRN Dasha Pierce MD       • amLODIPine (NORVASC) tablet 5 mg  5 mg Oral Daily Ho Blackmon MD   5 mg at 09/15/18 0829   • budesonide-formoterol (SYMBICORT) 160-4.5 MCG/ACT inhaler 2 puff  2 puff Inhalation BID - RT Dasha Pierce MD   2 puff at 09/15/18 0858   • cefTRIAXone (ROCEPHIN) 1 g/100 mL 0.9% NS (MBP)  1 g Intravenous Q24H Ho Blackmon MD   1 g at 09/15/18 0550   • enoxaparin (LOVENOX) syringe 40 mg  40 mg Subcutaneous Q24H Dasha Pierce MD   40 mg at 09/15/18 0941   • famotidine (PEPCID) tablet 20 mg  20 mg Oral Daily Dasha Pierce MD   20 mg at 09/15/18 0811   • furosemide (LASIX) injection 20 mg  20 mg Intravenous Q12H Ariel Moreland MD   20 mg at 09/15/18 0811   • Hold medication  1 each Does not apply Continuous PRN Ariel Moreland MD       • hydrALAZINE (APRESOLINE) injection 10 mg  10 mg Intravenous Q6H PRN Dasha Pierce MD       • ipratropium-albuterol (DUO-NEB) nebulizer solution 3 mL  3 mL Nebulization 4x Daily - RT Dasha Pierce MD   3 mL at 09/15/18 0858   • LORazepam (ATIVAN) injection 0.5 mg  0.5 mg Intravenous Q6H PRN Ariel Moreland MD   0.5 mg at 09/13/18 1208   • ondansetron (ZOFRAN) injection 4 mg  4 mg Intravenous Q6H PRN Ho Blackmon MD   4 mg at 09/13/18 0513   • predniSONE (DELTASONE) tablet 40 mg  40 mg  Oral Daily With Breakfast Dasha Pierce MD   40 mg at 09/15/18 0811   • sodium chloride 0.9 % flush 1-10 mL  1-10 mL Intravenous PRN Ho Blackmon MD       • sodium chloride 0.9 % flush 10 mL  10 mL Intravenous PRN Guillermo Stokes DO   10 mL at 09/13/18 1700   • tamsulosin (FLOMAX) 24 hr capsule 0.4 mg  0.4 mg Oral Nightly Ho Blackmon MD   0.4 mg at 09/14/18 2016   • traZODone (DESYREL) half tablet 25 mg  25 mg Oral Nightly PRN Ariel Moreland MD   25 mg at 09/14/18 4006       Review of Systems:   Review of Systems   Constitutional: Positive for fatigue.   Respiratory: Positive for cough and shortness of breath.    Cardiovascular: Negative for chest pain, palpitations and leg swelling.   Gastrointestinal: Negative for abdominal distention, abdominal pain, diarrhea and vomiting.   Genitourinary: Negative for discharge, frequency and urgency.   Musculoskeletal: Positive for back pain. Negative for arthralgias and gait problem.   Skin: Positive for pallor.   Neurological: Positive for weakness. Negative for seizures, syncope, light-headedness and headaches.   Psychiatric/Behavioral: Negative for agitation, behavioral problems and confusion.   All other systems reviewed and are negative.      Objective     Vital Signs  Temp:  [97.8 °F (36.6 °C)-98.9 °F (37.2 °C)] 98.9 °F (37.2 °C)  Heart Rate:  [] 109  Resp:  [18-22] 20  BP: (112-162)/(58-88) 142/76  FiO2 (%):  [70 %] 70 %    Physical Exam:  Physical Exam   Constitutional: He is oriented to person, place, and time. He appears well-developed and well-nourished. He appears ill. He appears distressed.   HENT:   Head: Normocephalic and atraumatic.   Eyes: Pupils are equal, round, and reactive to light. EOM are normal. No scleral icterus.   Neck: Normal range of motion. Neck supple.   Cardiovascular: Regular rhythm.  Tachycardia present.    Pulmonary/Chest: He is in respiratory distress. He has wheezes. He has rales.   Decreased air entry R chest    Abdominal: Soft. Bowel sounds are normal. He exhibits no distension. There is no tenderness.   Musculoskeletal: Normal range of motion. He exhibits no edema.   Neurological: He is alert and oriented to person, place, and time. No cranial nerve deficit.   Skin: Skin is warm and dry. There is pallor.   Psychiatric: He has a normal mood and affect. His behavior is normal.   Vitals reviewed.       Results Review:    Lab Results (last 24 hours)     Procedure Component Value Units Date/Time    Body Fluid Culture - Body Fluid, Pleural Cavity [[id number]]  (Normal) Collected:  09/13/18 1425    Specimen:  Body Fluid from Pleural Cavity Updated:  09/15/18 0752     BF Culture No growth at 2 days     Gram Stain Result Few (2+) WBCs seen      No organisms seen    Blood Culture - Blood, [[id number]]  (Normal) Collected:  09/12/18 0603    Specimen:  Blood from Arm, Left Updated:  09/15/18 0615     Blood Culture No growth at 3 days    Basic Metabolic Panel [[id number]]  (Abnormal) Collected:  09/15/18 0227    Specimen:  Blood Updated:  09/15/18 0346     Glucose 151 (H) mg/dL      BUN 30 (H) mg/dL      Creatinine 0.89 mg/dL      Sodium 140 mmol/L      Potassium 3.9 mmol/L      Chloride 101 mmol/L      CO2 30.0 mmol/L      Calcium 8.9 mg/dL      eGFR Non African Amer 82 mL/min/1.73      BUN/Creatinine Ratio 33.7 (H)     Anion Gap 9.0 mmol/L     Narrative:       The MDRD GFR formula is only valid for adults with stable renal function between ages 18 and 70.    Blood Culture - Blood, [[id number]]  (Normal) Collected:  09/12/18 0305    Specimen:  Blood from Arm, Left Updated:  09/15/18 0315     Blood Culture No growth at 3 days    CBC & Differential [[id number]] Collected:  09/15/18 0227    Specimen:  Blood Updated:  09/15/18 0302    Narrative:       The following orders were created for panel order CBC & Differential.  Procedure                               Abnormality         Status                     ---------                                -----------         ------                     CBC Auto Differential[[id number]]        Abnormal            Final result                 Please view results for these tests on the individual orders.    CBC Auto Differential [[id number]]  (Abnormal) Collected:  09/15/18 0227    Specimen:  Blood Updated:  09/15/18 0302     WBC 19.74 (H) 10*3/mm3      RBC 4.48 10*6/mm3      Hemoglobin 13.3 (L) g/dL      Hematocrit 39.4 %      MCV 87.9 fL      MCH 29.7 pg      MCHC 33.8 g/dL      RDW 14.7 (H) %      RDW-SD 47.6 (H) fl      MPV 11.5 fL      Platelets 268 10*3/mm3      Neutrophil % 87.0 (H) %      Lymphocyte % 5.2 (L) %      Monocyte % 7.0 %      Eosinophil % 0.1 %      Basophil % 0.1 %      Immature Grans % 0.6 (H) %      Neutrophils, Absolute 17.19 (H) 10*3/mm3      Lymphocytes, Absolute 1.03 10*3/mm3      Monocytes, Absolute 1.38 (H) 10*3/mm3      Eosinophils, Absolute 0.02 10*3/mm3      Basophils, Absolute 0.01 10*3/mm3      Immature Grans, Absolute 0.11 (H) 10*3/mm3           Imaging Results (last 24 hours)     Procedure Component Value Units Date/Time    XR Chest 1 View [[id number]] Collected:  09/15/18 0745     Updated:  09/15/18 0831    Narrative:       [facility] [place]    Patient Name: BREN GARCIA    ORDERING: PENNY HERNANDEZ     ATTENDING: HECTOR MADRIGAL     REFERRING: PENNY HERNANDEZ    -----------------------    PROCEDURE: Portable chest x-ray    TECHNIQUE: Single AP view of the chest    COMPARISON: 9/14/2018    HISTORY: acute hypoxic respiratory failure, J18.1 Lobar  pneumonia, unspecified organism R09.02 Hypoxemia J90 Pleural  effusion, not elsewhere classified    FINDINGS:     Life-support devices: None    Lungs/pleura: Consolidation in the left lung shows no significant  change. Opacification in the periphery of the mid to upper right  lung and right lung base is similar to the prior exam.    Heart, hilar and mediastinal structures: Stable accounting for  differences in projection  and technique.      Impression:       CONCLUSION:  Consolidation in the left lung shows no significant change.  Opacification in the periphery of the mid to upper right lung and  right lung base is similar to the prior exam.    Electronically signed by:  José Miguel Campbell MD  9/15/2018 8:30 AM CDT  Workstation: [id number]          Assessment/Plan     Principal Problem:    Pleural effusion  Active Problems:    Pneumonia of right upper lobe due to infectious organism (CMS/HCC)    Sepsis (CMS/HCC)    Hypertension    Pulmonary hypertension    SUMMER (acute kidney injury) (CMS/HCC)    His clinical condition improved to some extent after 1,600 cc bloody fluid was evacuated on thoracocentesis. Continue with the nebulized treatments, add IV Lasix. Continue with supplemental Oxygen.    His cardiac Echo showed tricuspid regurgitation and pulmonary hypertension.    Ariel Moreland MD  09/15/18  11:50 AM

## 2018-09-15 NOTE — SIGNIFICANT NOTE
Patient was getting ready for transfer to the floor when he became SOA, nauseated, and stated he was having severe heartburn. MD made aware, patient transfer was cancelled. EKG was obtained. Patient vomited x1. 100% nonrebreather applied due to O2 saturation in the 70s.

## 2018-09-15 NOTE — PROGRESS NOTES
"    CRITICAL CARE PROGRESS NOTE  Dasha Pierce MD    [facility] CRITICAL CARE  9/15/2018        Trae D eLa Paz  [id number]  1935  83 y.o. male            LOS: 3 days   Ho Blackmon MD    Chief Complaint/Reason for visit: F/u acute hypoxic respiratory failure    Subjective     Interval History:   History taken from: patient/ chart    Still requiring intermittent PAP, but tolerates high flow nasal cannula at times.  Needed trazodone to assist him with sleep using PAP.  States that his breathing is better and he feels like things are \"loosening up\", though he denies any sputum production.  He denies chest pain, but does complain of a headache which he believes may be related to his blood pressure.  Systolic blood pressures have ranged 112-151, but mostly in the 140s.  Issues with leakage around the Conti catheter so UOP inaccurate.  Afebrile.  Anxious to get out of bed.    Review of Systems:   Review of Systems   Constitutional: Positive for fatigue. Negative for fever.   Respiratory: Positive for cough and shortness of breath. Negative for chest tightness and wheezing.    Cardiovascular: Negative for chest pain and leg swelling.   Gastrointestinal: Negative for abdominal pain.   Neurological: Positive for weakness and headaches.     All systems were reviewed and negative except as noted above in the HPI.    Medical history, surgical history, social history, family history reviewed    Objective     Intake/Output:    Intake/Output Summary (Last 24 hours) at 09/15/18 0820  Last data filed at 09/15/18 0555   Gross per 24 hour   Intake              100 ml   Output              875 ml   Net             -775 ml       Nutrition: PO    Infusions:    hold 1 each       Respiratory:  FiO2 (%):  [70 %] 70 %  PEEP/CPAP (cm H2O):  [10 cm H20] 10 cm H20    Vital Sign Min/Max for last 24 hours:  Temp  Min: 97.8 °F (36.6 °C)  Max: 98.3 °F (36.8 °C)   BP  Min: 112/62  Max: 162/73   Pulse  Min: 78  Max: " 110   Resp  Min: 18  Max: 25   SpO2  Min: 87 %  Max: 98 %   Flow (L/min)  Min: 10  Max: 15   Weight  Min: 73 kg (160 lb 15 oz)  Max: 73 kg (160 lb 15 oz)     Physical Exam:  98.2 °F (36.8 °C) (Oral) 96 148/69 18 92% 73 kg (160 lb 15 oz) Body mass index is 24.47 kg/m².  Physical Exam   Constitutional: He is oriented to person, place, and time. Vital signs are normal. He appears well-developed and well-nourished.   HENT:   Head: Normocephalic and atraumatic.   Nose: Nose normal.   Eyes: EOM and lids are normal.   Cardiovascular: Regular rhythm and normal heart sounds.  Tachycardia present.  PMI is not displaced.  Exam reveals no gallop.    No murmur heard.  Hyperdynamic precordium   Pulmonary/Chest: Effort normal. Tachypnea noted. No respiratory distress. He has decreased breath sounds in the right lower field and the left lower field. He has wheezes in the left upper field and the left middle field. He has no rhonchi. He has no rales.   Abdominal: Soft. Normal appearance and bowel sounds are normal. There is no hepatomegaly. There is no tenderness.   Musculoskeletal:   MAEW, no extremity edema     Vascular Status -  His right foot exhibits no edema. His left foot exhibits no edema.  Neurological: He is alert and oriented to person, place, and time.   Skin: Skin is warm and dry. No cyanosis. Nails show no clubbing.   Psychiatric: He has a normal mood and affect. His behavior is normal. Judgment normal. Cognition and memory are normal.       Central Lines/PICC: absent     Results Review:  I personally reviewed the patient's new clinical results.   Lab Results (last 24 hours)     Procedure Component Value Units Date/Time    Body Fluid Culture - Body Fluid, Pleural Cavity [[id number]]  (Normal) Collected:  09/13/18 1425    Specimen:  Body Fluid from Pleural Cavity Updated:  09/15/18 0752     BF Culture No growth at 2 days     Gram Stain Result Few (2+) WBCs seen      No organisms seen    Blood Culture - Blood, [[id number]]   (Normal) Collected:  09/12/18 0603    Specimen:  Blood from Arm, Left Updated:  09/15/18 0615     Blood Culture No growth at 3 days    Basic Metabolic Panel [[id number]]  (Abnormal) Collected:  09/15/18 0227    Specimen:  Blood Updated:  09/15/18 0346     Glucose 151 (H) mg/dL      BUN 30 (H) mg/dL      Creatinine 0.89 mg/dL      Sodium 140 mmol/L      Potassium 3.9 mmol/L      Chloride 101 mmol/L      CO2 30.0 mmol/L      Calcium 8.9 mg/dL      eGFR Non African Amer 82 mL/min/1.73      BUN/Creatinine Ratio 33.7 (H)     Anion Gap 9.0 mmol/L     Narrative:       The MDRD GFR formula is only valid for adults with stable renal function between ages 18 and 70.    Blood Culture - Blood, [[id number]]  (Normal) Collected:  09/12/18 0305    Specimen:  Blood from Arm, Left Updated:  09/15/18 0315     Blood Culture No growth at 3 days    CBC & Differential [[id number]] Collected:  09/15/18 0227    Specimen:  Blood Updated:  09/15/18 0302    Narrative:       The following orders were created for panel order CBC & Differential.  Procedure                               Abnormality         Status                     ---------                               -----------         ------                     CBC Auto Differential[[id number]]        Abnormal            Final result                 Please view results for these tests on the individual orders.    CBC Auto Differential [[id number]]  (Abnormal) Collected:  09/15/18 0227    Specimen:  Blood Updated:  09/15/18 0302     WBC 19.74 (H) 10*3/mm3      RBC 4.48 10*6/mm3      Hemoglobin 13.3 (L) g/dL      Hematocrit 39.4 %      MCV 87.9 fL      MCH 29.7 pg      MCHC 33.8 g/dL      RDW 14.7 (H) %      RDW-SD 47.6 (H) fl      MPV 11.5 fL      Platelets 268 10*3/mm3      Neutrophil % 87.0 (H) %      Lymphocyte % 5.2 (L) %      Monocyte % 7.0 %      Eosinophil % 0.1 %      Basophil % 0.1 %      Immature Grans % 0.6 (H) %      Neutrophils, Absolute 17.19 (H) 10*3/mm3      Lymphocytes, Absolute  1.03 10*3/mm3      Monocytes, Absolute 1.38 (H) 10*3/mm3      Eosinophils, Absolute 0.02 10*3/mm3      Basophils, Absolute 0.01 10*3/mm3      Immature Grans, Absolute 0.11 (H) 10*3/mm3           Results from last 7 days  Lab Units 09/13/18  1425   PH, ARTERIAL pH units 7.389   PO2 ART mm Hg 157.0*   PCO2, ARTERIAL mm Hg 43.2   HCO3 ART mmol/L 26.1*     Lab Results   Component Value Date    BLOODCX No growth at 3 days 09/12/2018     No results found for: URINECX    I independently reviewed the patient's new imaging, including images and reports.  CXR 9/15/18: small loculated right pleural effusion unchanged, persistent vascular congestion, decreased interstitial edema on left    All medications reviewed.     amLODIPine 5 mg Oral Daily   budesonide-formoterol 2 puff Inhalation BID - RT   ceftriaxone 1 g Intravenous Q24H   enoxaparin 40 mg Subcutaneous Q24H   famotidine 20 mg Oral Daily   furosemide 20 mg Intravenous Q12H   ipratropium-albuterol 3 mL Nebulization 4x Daily - RT   predniSONE 40 mg Oral Daily With Breakfast   tamsulosin 0.4 mg Oral Nightly         Assessment/Plan     ASSESSMENT:  # Acute hypoxemic respiratory failure  # Large exudative right pleural effusion  # CAP  # Acute pulmonary edema, L>R  # HTN  # Presumed COPD with exacerbation  # H/o tobacco use  # BPH  # Mild hyperglycemia  # PH      PLAN:  -O2 to keep sats >88%. Can use HF NC up to 15L if needed.  -Bipap prn WOB or refractory hypoxemia  -Cont prednisone 40mg PO daily  -Cont Symbicort 160 BID and schedule duonebs  -Albuterol prn  -Cont ceftriaxone x 7d  -F/u pleural fluid cytology and culture  -Cont lasix 20mg q12hrs   -Cont home norvasc, but may require additional anti-HTN  -Hydralazine IV prn SBP >180  -Will need repeat TTE with PH protocol read by Dr. Rowell at some point to f/u PH  -If hypoxemia persists despite improving chest imaging, would recommend consulting Dr. Rowell  -Diet as tolerated but make NPO if decompensates  -Attempt  OOB (dangle first) as tolerated  -Remove morris. If unable to use urinal, would place condom cath  -PPX: lovenox and pepcid  -FULL CODE    D/w RN    Critical Care Time Spent: 27 minutes  I personally provided care to this critically ill patient as documented above.  Critical care time does not include time spent on separately billed procedures.  None of my critical care time was concurrent with other critical care providers.         This document has been electronically signed by Dasha Pierce MD on September 15, 2018 8:20 AM      [phone]    Dictated using Dragon

## 2018-09-16 PROBLEM — J96.01 ACUTE RESPIRATORY FAILURE WITH HYPOXIA (HCC): Status: ACTIVE | Noted: 2018-01-01

## 2018-09-16 PROBLEM — J42 CHRONIC BRONCHITIS (HCC): Chronic | Status: ACTIVE | Noted: 2018-01-01

## 2018-09-16 NOTE — PROGRESS NOTES
CRITICAL CARE PROGRESS NOTE  Dasha Pierce MD    [facility] CRITICAL CARE  9/16/2018        Trae De La Paz  [id number]  1935  83 y.o. male            LOS: 4 days   Ho Blackmon MD    Chief Complaint/Reason for visit: F/u acute hypoxic respiratory failure    Subjective     Interval History:   History taken from: patient/ chart    Required high flow oxygen at 15 L, but desaturation later in the day so placed back on CPAP.  He has remained on CPAP overnight and did require sedative to assist with sleep due to anxiety/agitation.  Some tachycardia, but it has now resolved. SBP ranged 120-170s. Conti replaced with condom catheter.  Diuresed well, net negative 1.7L, furosemide IV 40 mg.  Prednisone changed to Solu-Medrol 60 mg IV q8hrs last night. Currently, asleep.    Review of Systems:   Review of Systems   Unable to perform ROS: Acuity of condition     All systems were reviewed and negative except as noted above in the HPI.    Medical history, surgical history, social history, family history reviewed    Objective     Intake/Output:    Intake/Output Summary (Last 24 hours) at 09/16/18 0850  Last data filed at 09/16/18 0622   Gross per 24 hour   Intake              100 ml   Output             1575 ml   Net            -1475 ml       Nutrition: PO    Infusions:       Respiratory:  FiO2 (%):  [70 %] 70 %  PEEP/CPAP (cm H2O):  [10 cm H20] 10 cm H20    Vital Sign Min/Max for last 24 hours:  Temp  Min: 97.6 °F (36.4 °C)  Max: 98 °F (36.7 °C)   BP  Min: 123/67  Max: 177/88   Pulse  Min: 83  Max: 124   Resp  Min: 18  Max: 28   SpO2  Min: 82 %  Max: 94 %   Flow (L/min)  Min: 15  Max: 15   Weight  Min: 71.8 kg (158 lb 4.6 oz)  Max: 71.8 kg (158 lb 4.6 oz)     Physical Exam:  97.8 °F (36.6 °C) (Oral) 83 123/67 22 94% 71.8 kg (158 lb 4.6 oz) Body mass index is 24.07 kg/m².  Physical Exam   Constitutional: Vital signs are normal. He appears well-developed and well-nourished. He is sleeping. He is  easily aroused.   HENT:   Head: Normocephalic and atraumatic.   Nose: Nose normal.   Eyes: EOM and lids are normal.   Cardiovascular: Normal rate, regular rhythm and normal heart sounds.  PMI is not displaced.  Exam reveals no gallop.    No murmur heard.  Hyperdynamic precordium   Pulmonary/Chest: Effort normal. No tachypnea. No respiratory distress. He has decreased breath sounds in the right lower field and the left lower field. He has no wheezes. He has rhonchi in the left middle field. He has no rales.   Abdominal: Soft. Normal appearance and bowel sounds are normal. There is no hepatomegaly. There is no tenderness.   Musculoskeletal:   MAEW, no extremity edema     Vascular Status -  His right foot exhibits no edema. His left foot exhibits no edema.  Neurological: He is easily aroused.   Somnolent but arousable   Skin: Skin is warm and dry. No cyanosis. Nails show no clubbing.   Psychiatric: He has a normal mood and affect. His behavior is normal. Judgment normal. Cognition and memory are normal.       Central Lines/PICC: absent     Results Review:  I personally reviewed the patient's new clinical results.   Lab Results (last 24 hours)     Procedure Component Value Units Date/Time    Basic Metabolic Panel [[id number]] Collected:  09/16/18 0826    Specimen:  Blood Updated:  09/16/18 0838    Body Fluid Culture - Body Fluid, Pleural Cavity [[id number]]  (Normal) Collected:  09/13/18 1425    Specimen:  Body Fluid from Pleural Cavity Updated:  09/16/18 0814     BF Culture No growth at 3 days     Gram Stain Result Few (2+) WBCs seen      No organisms seen    Blood Culture - Blood, [[id number]]  (Normal) Collected:  09/12/18 0603    Specimen:  Blood from Arm, Left Updated:  09/16/18 0615     Blood Culture No growth at 4 days    CBC & Differential [[id number]] Collected:  09/16/18 0441    Specimen:  Blood Updated:  09/16/18 0457    Narrative:       The following orders were created for panel order CBC &  Differential.  Procedure                               Abnormality         Status                     ---------                               -----------         ------                     CBC Auto Differential[[id number]]        Abnormal            Final result                 Please view results for these tests on the individual orders.    CBC Auto Differential [[id number]]  (Abnormal) Collected:  09/16/18 0441    Specimen:  Blood Updated:  09/16/18 0457     WBC 20.85 (H) 10*3/mm3      RBC 4.60 10*6/mm3      Hemoglobin 13.8 g/dL      Hematocrit 40.8 %      MCV 88.7 fL      MCH 30.0 pg      MCHC 33.8 g/dL      RDW 14.8 (H) %      RDW-SD 47.6 (H) fl      MPV 11.2 fL      Platelets 311 10*3/mm3      Neutrophil % 94.4 (H) %      Lymphocyte % 2.1 (L) %      Monocyte % 3.0 %      Eosinophil % 0.0 %      Basophil % 0.0 %      Immature Grans % 0.5 %      Neutrophils, Absolute 19.67 (H) 10*3/mm3      Lymphocytes, Absolute 0.43 (L) 10*3/mm3      Monocytes, Absolute 0.63 10*3/mm3      Eosinophils, Absolute 0.00 10*3/mm3      Basophils, Absolute 0.01 10*3/mm3      Immature Grans, Absolute 0.11 (H) 10*3/mm3     Blood Culture - Blood, [[id number]]  (Normal) Collected:  09/12/18 0305    Specimen:  Blood from Arm, Left Updated:  09/16/18 0315     Blood Culture No growth at 4 days    Blood Gas, Arterial [[id number]]  (Abnormal) Collected:  09/15/18 1931    Specimen:  Arterial Blood Updated:  09/15/18 1939     Site Left Radial     Roverto's Test Positive     pH, Arterial 7.455 (H) pH units      pCO2, Arterial 45.0 mm Hg      pO2, Arterial 62.3 (L) mm Hg      HCO3, Arterial 31.6 (H) mmol/L      Base Excess, Arterial 6.6 (H) mmol/L      O2 Saturation, Arterial 91.7 (L) %      Barometric Pressure for Blood Gas 748 mmHg      Modality HFNC     Flow Rate 15.0 lpm      Ventilator Mode NA     Collected by pastor          Results from last 7 days  Lab Units 09/15/18  1931   PH, ARTERIAL pH units 7.455*   PO2 ART mm Hg 62.3*   PCO2, ARTERIAL mm Hg 45.0    HCO3 ART mmol/L 31.6*     Lab Results   Component Value Date    BLOODCX No growth at 4 days 09/12/2018     No results found for: URINECX    I independently reviewed the patient's new imaging, including images and reports.  CXR 9/15/18: small loculated right pleural effusion unchanged, persistent vascular congestion, decreased interstitial edema on left    All medications reviewed.     amLODIPine 5 mg Oral Daily   budesonide-formoterol 2 puff Inhalation BID - RT   ceftriaxone 1 g Intravenous Q24H   enoxaparin 40 mg Subcutaneous Q24H   famotidine 20 mg Oral Daily   furosemide 20 mg Intravenous Q12H   ipratropium-albuterol 3 mL Nebulization 4x Daily - RT   methylPREDNISolone sodium succinate 40 mg Intravenous Q8H   tamsulosin 0.4 mg Oral Nightly         Assessment/Plan     ASSESSMENT:  # Acute hypoxemic respiratory failure  # Large exudative right pleural effusion  # CAP  # Acute pulmonary edema, L>R  # HTN  # Presumed COPD with exacerbation  # H/o tobacco use  # BPH  # Mild hyperglycemia  # PH      PLAN:  -O2 to keep sats >88%. Can use HF NC up to 15L if needed.  -Bipap prn WOB or refractory hypoxemia  -Upright CXR in AM  -Decrease solumedrol to 40mg q8hrs  -Cont Symbicort 160 BID and schedule duonebs  -Albuterol prn  -Cont ceftriaxone x 7d  -F/u pleural fluid cytology and culture  -Increase lasix to 40mg q12hrs. Strict I&Os.  -Cont home norvasc, but may require additional anti-HTN  -Hydralazine IV prn SBP >180  -Will need repeat TTE with PH protocol read by Dr. Rowell at some point to f/u PH  -If hypoxemia persists despite improving chest imaging, would recommend consulting Dr. Rowell  -Diet as tolerated but make NPO if decompensates  -Attempt OOB (dangle first) as tolerated  -PPX: lovenox and pepcid  -FULL CODE    Updated wife    I'm concerned that the patient is not improving with regards to his hypoxemia.  Chest x-ray is relatively unchanged and shows persistent increased opacity on the left.  This may  simply be edema, but I would've expected it to be more symmetrical.  He did have an echocardiogram which showed significant pulmonary hypertension, which is likely long-standing given the severity and this certainly could be playing a part of his hypoxemia.    Critical Care Time Spent: 32 minutes  I personally provided care to this critically ill patient as documented above.  Critical care time does not include time spent on separately billed procedures.  None of my critical care time was concurrent with other critical care providers.         This document has been electronically signed by Dasha Pierce MD on September 16, 2018 8:50 AM      [phone]    Dictated using Dragon

## 2018-09-16 NOTE — PLAN OF CARE
Problem: Patient Care Overview  Goal: Plan of Care Review  Outcome: Ongoing (interventions implemented as appropriate)   09/16/18 0298   Coping/Psychosocial   Plan of Care Reviewed With patient   Plan of Care Review   Progress improving   OTHER   Outcome Summary Patient vss, no distress noted, nonrebreather with 2-3 liters nc for assistance. Patient mouth breathes, occasional prompting to breathe through nose. No episodes of respiratory distress this shift. UOP adequate.      Goal: Individualization and Mutuality  Outcome: Ongoing (interventions implemented as appropriate)    Goal: Discharge Needs Assessment  Outcome: Ongoing (interventions implemented as appropriate)    Goal: Interprofessional Rounds/Family Conf  Outcome: Ongoing (interventions implemented as appropriate)      Problem: Sepsis/Septic Shock (Adult)  Goal: Signs and Symptoms of Listed Potential Problems Will be Absent, Minimized or Managed (Sepsis/Septic Shock)  Outcome: Ongoing (interventions implemented as appropriate)      Problem: Breathing Pattern Ineffective (Adult)  Goal: Effective Oxygenation/Ventilation  Outcome: Ongoing (interventions implemented as appropriate)    Goal: Anxiety/Fear Reduction  Outcome: Ongoing (interventions implemented as appropriate)      Problem: Pneumonia (Adult)  Goal: Signs and Symptoms of Listed Potential Problems Will be Absent, Minimized or Managed (Pneumonia)  Outcome: Ongoing (interventions implemented as appropriate)      Problem: Fall Risk (Adult)  Goal: Absence of Fall  Outcome: Ongoing (interventions implemented as appropriate)      Problem: Surgery Nonspecified (Adult)  Goal: Signs and Symptoms of Listed Potential Problems Will be Absent, Minimized or Managed (Surgery Nonspecified)  Outcome: Ongoing (interventions implemented as appropriate)    Goal: Anesthesia/Sedation Recovery  Outcome: Outcome(s) achieved Date Met: 09/16/18      Problem: Skin Injury Risk (Adult)  Goal: Identify Related Risk Factors and  Signs and Symptoms  Outcome: Ongoing (interventions implemented as appropriate)    Goal: Skin Health and Integrity  Outcome: Ongoing (interventions implemented as appropriate)

## 2018-09-16 NOTE — PROGRESS NOTES
Progress Note  Ariel Moreland MD  Hospitalist    Date of visit: 9/16/2018     LOS: 4 days   Patient Care Team:  Félix Tran MD as PCP - General (Family Medicine)    Chief Complaint: dyspnea    Subjective     Interval History:     Patient Complaints: shortness of breath, respiratory distress - improved to some extent.  Has had episodes of shortness of breath and hypoxia sometime late afternoon yesterday    History taken from: patient    Medication Review:   Current Facility-Administered Medications   Medication Dose Route Frequency Provider Last Rate Last Dose   • acetaminophen (TYLENOL) tablet 650 mg  650 mg Oral Q4H PRN Ho Blackmon MD   650 mg at 09/15/18 0546   • albuterol (PROVENTIL) nebulizer solution 0.083% 2.5 mg/3mL  2.5 mg Nebulization Q4H PRN Dasha Pierce MD       • amLODIPine (NORVASC) tablet 5 mg  5 mg Oral Daily Ho Blackmon MD   5 mg at 09/16/18 0932   • budesonide-formoterol (SYMBICORT) 160-4.5 MCG/ACT inhaler 2 puff  2 puff Inhalation BID - RT Dasha Pierce MD   2 puff at 09/16/18 0828   • cefTRIAXone (ROCEPHIN) 1 g/100 mL 0.9% NS (MBP)  1 g Intravenous Q24H Ho Blackmon MD   1 g at 09/16/18 0622   • enoxaparin (LOVENOX) syringe 40 mg  40 mg Subcutaneous Q24H Dasha Pierce MD   40 mg at 09/16/18 1107   • famotidine (PEPCID) tablet 20 mg  20 mg Oral Daily Dasha Pierce MD   20 mg at 09/16/18 1108   • furosemide (LASIX) injection 40 mg  40 mg Intravenous Q12H Ariel Moreland MD       • hydrALAZINE (APRESOLINE) injection 10 mg  10 mg Intravenous Q6H PRN Dasha Pierce MD       • ipratropium-albuterol (DUO-NEB) nebulizer solution 3 mL  3 mL Nebulization 4x Daily - RT Dasha Pierce MD   3 mL at 09/16/18 0828   • LORazepam (ATIVAN) injection 0.5 mg  0.5 mg Intravenous Q6H PRN Ariel Moreland MD   0.5 mg at 09/15/18 1526   • methylPREDNISolone sodium succinate (SOLU-Medrol) injection 40 mg  40 mg Intravenous Q8H Dasha Pierce MD       • ondansetron (ZOFRAN) injection 4  mg  4 mg Intravenous Q6H PRN Ho Blackmon MD   4 mg at 09/15/18 1757   • promethazine (PHENERGAN) injection 12.5 mg  12.5 mg Intravenous Q6H PRN Ariel Moreland MD   12.5 mg at 09/16/18 0515   • sodium chloride 0.9 % flush 1-10 mL  1-10 mL Intravenous PRN Ho Blackmon MD       • sodium chloride 0.9 % flush 10 mL  10 mL Intravenous PRN Guillermo Stokes DO   10 mL at 09/13/18 1700   • tamsulosin (FLOMAX) 24 hr capsule 0.4 mg  0.4 mg Oral Nightly Ho Blackmon MD   0.4 mg at 09/15/18 2004   • traZODone (DESYREL) half tablet 25 mg  25 mg Oral Nightly PRN Ariel Moreland MD   25 mg at 09/14/18 2216       Review of Systems:   Review of Systems   Constitutional: Positive for fatigue.   Respiratory: Positive for cough and shortness of breath.    Cardiovascular: Negative for chest pain, palpitations and leg swelling.   Gastrointestinal: Negative for abdominal distention, abdominal pain, diarrhea and vomiting.   Genitourinary: Negative for discharge, frequency and urgency.   Musculoskeletal: Positive for back pain. Negative for arthralgias and gait problem.   Skin: Positive for pallor.   Neurological: Positive for weakness. Negative for seizures, syncope, light-headedness and headaches.   Psychiatric/Behavioral: Negative for agitation, behavioral problems and confusion.   All other systems reviewed and are negative.      Objective     Vital Signs  Temp:  [97.1 °F (36.2 °C)-98 °F (36.7 °C)] 97.1 °F (36.2 °C)  Heart Rate:  [] 82  Resp:  [18-28] 22  BP: (123-177)/(61-96) 129/61  FiO2 (%):  [70 %] 70 %    Physical Exam:  Physical Exam   Constitutional: He is oriented to person, place, and time. He appears well-developed and well-nourished. He appears ill. He appears distressed.   HENT:   Head: Normocephalic and atraumatic.   Eyes: Pupils are equal, round, and reactive to light. EOM are normal. No scleral icterus.   Neck: Normal range of motion. Neck supple.   Cardiovascular: Regular rhythm.  Tachycardia present.     Pulmonary/Chest: He is in respiratory distress. He has wheezes. He has rales.   Decreased air entry R chest   Abdominal: Soft. Bowel sounds are normal. He exhibits no distension. There is no tenderness.   Musculoskeletal: Normal range of motion. He exhibits no edema.   Neurological: He is alert and oriented to person, place, and time. No cranial nerve deficit.   Skin: Skin is warm and dry. There is pallor.   Psychiatric: He has a normal mood and affect. His behavior is normal.   Vitals reviewed.       Results Review:    Lab Results (last 24 hours)     Procedure Component Value Units Date/Time    Basic Metabolic Panel [[id number]]  (Abnormal) Collected:  09/16/18 0826    Specimen:  Blood Updated:  09/16/18 0855     Glucose 189 (H) mg/dL      BUN 36 (H) mg/dL      Creatinine 0.89 mg/dL      Sodium 139 mmol/L      Potassium 4.6 mmol/L      Chloride 100 mmol/L      CO2 33.0 (H) mmol/L      Calcium 9.2 mg/dL      eGFR Non African Amer 82 mL/min/1.73      BUN/Creatinine Ratio 40.4 (H)     Anion Gap 6.0 mmol/L     Narrative:       The MDRD GFR formula is only valid for adults with stable renal function between ages 18 and 70.    Body Fluid Culture - Body Fluid, Pleural Cavity [[id number]]  (Normal) Collected:  09/13/18 1425    Specimen:  Body Fluid from Pleural Cavity Updated:  09/16/18 0814     BF Culture No growth at 3 days     Gram Stain Result Few (2+) WBCs seen      No organisms seen    Blood Culture - Blood, [[id number]]  (Normal) Collected:  09/12/18 0603    Specimen:  Blood from Arm, Left Updated:  09/16/18 0615     Blood Culture No growth at 4 days    CBC & Differential [[id number]] Collected:  09/16/18 0441    Specimen:  Blood Updated:  09/16/18 0457    Narrative:       The following orders were created for panel order CBC & Differential.  Procedure                               Abnormality         Status                     ---------                               -----------         ------                     CBC  Auto Differential[[id number]]        Abnormal            Final result                 Please view results for these tests on the individual orders.    CBC Auto Differential [[id number]]  (Abnormal) Collected:  09/16/18 0441    Specimen:  Blood Updated:  09/16/18 0457     WBC 20.85 (H) 10*3/mm3      RBC 4.60 10*6/mm3      Hemoglobin 13.8 g/dL      Hematocrit 40.8 %      MCV 88.7 fL      MCH 30.0 pg      MCHC 33.8 g/dL      RDW 14.8 (H) %      RDW-SD 47.6 (H) fl      MPV 11.2 fL      Platelets 311 10*3/mm3      Neutrophil % 94.4 (H) %      Lymphocyte % 2.1 (L) %      Monocyte % 3.0 %      Eosinophil % 0.0 %      Basophil % 0.0 %      Immature Grans % 0.5 %      Neutrophils, Absolute 19.67 (H) 10*3/mm3      Lymphocytes, Absolute 0.43 (L) 10*3/mm3      Monocytes, Absolute 0.63 10*3/mm3      Eosinophils, Absolute 0.00 10*3/mm3      Basophils, Absolute 0.01 10*3/mm3      Immature Grans, Absolute 0.11 (H) 10*3/mm3     Blood Culture - Blood, [[id number]]  (Normal) Collected:  09/12/18 0305    Specimen:  Blood from Arm, Left Updated:  09/16/18 0315     Blood Culture No growth at 4 days    Blood Gas, Arterial [[id number]]  (Abnormal) Collected:  09/15/18 1931    Specimen:  Arterial Blood Updated:  09/15/18 1939     Site Left Radial     Roverto's Test Positive     pH, Arterial 7.455 (H) pH units      pCO2, Arterial 45.0 mm Hg      pO2, Arterial 62.3 (L) mm Hg      HCO3, Arterial 31.6 (H) mmol/L      Base Excess, Arterial 6.6 (H) mmol/L      O2 Saturation, Arterial 91.7 (L) %      Barometric Pressure for Blood Gas 748 mmHg      Modality HFNC     Flow Rate 15.0 lpm      Ventilator Mode NA     Collected by tj          Imaging Results (last 24 hours)     Procedure Component Value Units Date/Time    XR Chest 1 View [[id number]] Collected:  09/15/18 1946     Updated:  09/15/18 2007    Narrative:         EXAM:         Radiograph(s), Chest   VIEWS:   Frontal  ; 1       DATE/TIME:  9/15/2018 8:00 PM CDT                INDICATION:   possible  xray., J18.1 Lobar pneumonia, unspecified  organism R09.02 Hypoxemia J90 Pleural effusion, not elsewhere  classified    COMPARISON:  CXR: 9/15/18 at 07:46 hours             FINDINGS:             - lines/tubes:    none     - cardiac:         size within normal limits         - mediastinum: contour within normal limits         - lungs:         Bilateral airspace disease, radiographically  unchanged.             - pleura:         no evidence of  fluid                  - osseous:         unremarkable for age                  - misc.:         Impression:       CONCLUSION:        1. Bilateral airspace disease, radiographically unchanged.                                                              Electronically signed by:  HILLARY Barth MD  9/15/2018 8:06  PM CDT Workstation: [id number]          Assessment/Plan     Principal Problem:    Acute respiratory failure with hypoxia (CMS/HCC)  Active Problems:    Pneumonia of right upper lobe due to infectious organism (CMS/HCC)    Pleural effusion    Hypertension    Pulmonary hypertension    Chronic bronchitis (CMS/HCC)    Sepsis (CMS/HCC)    SUMMER (acute kidney injury) (CMS/HCC)    Remains hypoxic. We'll continue with the BiPAP, IV antibiotics, IV Lasix, nebulized treatments.    Ariel Moreland MD  09/16/18  11:41 AM

## 2018-09-16 NOTE — PLAN OF CARE
Problem: Patient Care Overview  Goal: Plan of Care Review  Outcome: Ongoing (interventions implemented as appropriate)   09/16/18 0539   Coping/Psychosocial   Plan of Care Reviewed With patient   OTHER   Outcome Summary Patient has improved with respiratory status throughout the night after he finally was able to fall asleep. No use of accesory muscles now, and tolerating CPAP for the last 6 hours. Patient continutes to have incontinence of urine so condom cath applied.      Goal: Discharge Needs Assessment  Outcome: Ongoing (interventions implemented as appropriate)    Goal: Interprofessional Rounds/Family Conf  Outcome: Ongoing (interventions implemented as appropriate)      Problem: Sepsis/Septic Shock (Adult)  Goal: Signs and Symptoms of Listed Potential Problems Will be Absent, Minimized or Managed (Sepsis/Septic Shock)  Outcome: Ongoing (interventions implemented as appropriate)      Problem: Breathing Pattern Ineffective (Adult)  Goal: Effective Oxygenation/Ventilation  Outcome: Ongoing (interventions implemented as appropriate)    Goal: Anxiety/Fear Reduction  Outcome: Ongoing (interventions implemented as appropriate)      Problem: Pneumonia (Adult)  Goal: Signs and Symptoms of Listed Potential Problems Will be Absent, Minimized or Managed (Pneumonia)  Outcome: Ongoing (interventions implemented as appropriate)      Problem: Fall Risk (Adult)  Goal: Absence of Fall  Outcome: Ongoing (interventions implemented as appropriate)      Problem: Surgery Nonspecified (Adult)  Goal: Signs and Symptoms of Listed Potential Problems Will be Absent, Minimized or Managed (Surgery Nonspecified)  Outcome: Ongoing (interventions implemented as appropriate)    Goal: Anesthesia/Sedation Recovery  Outcome: Ongoing (interventions implemented as appropriate)      Problem: Skin Injury Risk (Adult)  Goal: Identify Related Risk Factors and Signs and Symptoms  Outcome: Ongoing (interventions implemented as appropriate)    Goal: Skin  Health and Integrity  Outcome: Ongoing (interventions implemented as appropriate)

## 2018-09-17 NOTE — PLAN OF CARE
Problem: Patient Care Overview  Goal: Plan of Care Review  Outcome: Ongoing (interventions implemented as appropriate)   09/17/18 0545   Coping/Psychosocial   Plan of Care Reviewed With patient   Plan of Care Review   Progress improving   OTHER   Outcome Summary Patient on 5L NC and NRB thoroughout the night. No episodes of respiratory distress. Patient did have a frequent nonproductive cough throughout the night. Appears to be feeling some better and did not have to wear the CPAP over the night.     Goal: Discharge Needs Assessment  Outcome: Ongoing (interventions implemented as appropriate)    Goal: Interprofessional Rounds/Family Conf  Outcome: Ongoing (interventions implemented as appropriate)      Problem: Sepsis/Septic Shock (Adult)  Goal: Signs and Symptoms of Listed Potential Problems Will be Absent, Minimized or Managed (Sepsis/Septic Shock)  Outcome: Ongoing (interventions implemented as appropriate)      Problem: Breathing Pattern Ineffective (Adult)  Goal: Effective Oxygenation/Ventilation  Outcome: Ongoing (interventions implemented as appropriate)    Goal: Anxiety/Fear Reduction  Outcome: Ongoing (interventions implemented as appropriate)      Problem: Pneumonia (Adult)  Goal: Signs and Symptoms of Listed Potential Problems Will be Absent, Minimized or Managed (Pneumonia)  Outcome: Ongoing (interventions implemented as appropriate)      Problem: Fall Risk (Adult)  Goal: Absence of Fall  Outcome: Ongoing (interventions implemented as appropriate)      Problem: Surgery Nonspecified (Adult)  Goal: Signs and Symptoms of Listed Potential Problems Will be Absent, Minimized or Managed (Surgery Nonspecified)  Outcome: Ongoing (interventions implemented as appropriate)      Problem: Skin Injury Risk (Adult)  Goal: Identify Related Risk Factors and Signs and Symptoms  Outcome: Ongoing (interventions implemented as appropriate)    Goal: Skin Health and Integrity  Outcome: Ongoing (interventions implemented as  appropriate)

## 2018-09-17 NOTE — PLAN OF CARE
Problem: Patient Care Overview  Goal: Plan of Care Review  Outcome: Ongoing (interventions implemented as appropriate)   09/17/18 1407   Coping/Psychosocial   Plan of Care Reviewed With caregiver;patient   Plan of Care Review   Progress no change   OTHER   Outcome Summary New Assessment. Pt currently on non-rebreather for acute resp failure and CAP. Minimal intake with resp distress. Will add supplements and monitor.        Problem: Skin Injury Risk (Adult)  Intervention: Promote/Optimize Nutrition   09/17/18 1407   Nutrition Interventions   Oral Nutrition Promotion calorie dense foods provided;calorie dense liquids provided;nutritional therapy counseling provided

## 2018-09-17 NOTE — PROGRESS NOTES
[facility] Medicine Services  INPATIENT PROGRESS NOTE    Length of Stay: 5  Date of Admission: 9/11/2018  Primary Care Physician: Félix Tran MD    Subjective   Chief Complaint:  Shortness of breath  HPI:  Patient states that he is feeling some better.  Breathing is slowly improving.    Review of Systems   Constitutional: Positive for fatigue. Negative for appetite change, chills and fever.   Respiratory: Positive for cough and shortness of breath. Negative for chest tightness.    Cardiovascular: Negative for chest pain, palpitations and leg swelling.   Gastrointestinal: Negative for abdominal pain, constipation, diarrhea, nausea and vomiting.   Skin: Negative for wound.   Neurological: Positive for weakness. Negative for dizziness, light-headedness, numbness and headaches.        All pertinent negatives and positives are as above. All other systems have been reviewed and are negative unless otherwise stated.     Objective    Temp:  [97.5 °F (36.4 °C)-98.6 °F (37 °C)] 98.2 °F (36.8 °C)  Heart Rate:  [] 92  Resp:  [20-24] 24  BP: (125-180)/(55-87) 164/81      Physical Exam   Constitutional: He appears well-developed and well-nourished.   HENT:   Head: Normocephalic and atraumatic.   Eyes: Pupils are equal, round, and reactive to light. EOM are normal.   Neck: Normal range of motion. Neck supple.   Cardiovascular: Normal rate, regular rhythm, normal heart sounds and intact distal pulses.  Exam reveals no gallop and no friction rub.    No murmur heard.  Pulmonary/Chest: Effort normal. No respiratory distress. He has decreased breath sounds. He has no wheezes. He has no rales. He exhibits no tenderness.   Abdominal: Soft. Bowel sounds are normal. He exhibits no distension. There is no tenderness.   Psychiatric: He has a normal mood and affect. His behavior is normal.   Vitals reviewed.          Results Review:  I have reviewed the labs, radiology results, and  diagnostic studies.    Laboratory Data:     Results from last 7 days  Lab Units 09/17/18  0320 09/16/18  0826 09/15/18  0227  09/12/18  0039   SODIUM mmol/L 138 139 140  < > 141   POTASSIUM mmol/L 4.3 4.6 3.9  < > 4.8   CHLORIDE mmol/L 96 100 101  < > 101   CO2 mmol/L 34.0* 33.0* 30.0  < > 29.0   BUN mg/dL 43* 36* 30*  < > 29*   CREATININE mg/dL 0.89 0.89 0.89  < > 1.31*   GLUCOSE mg/dL 204* 189* 151*  < > 142*   CALCIUM mg/dL 9.2 9.2 8.9  < > 10.0   BILIRUBIN mg/dL  --   --   --   --  0.5   ALK PHOS U/L  --   --   --   --  120   ALT (SGPT) U/L  --   --   --   --  26   AST (SGOT) U/L  --   --   --   --  28   ANION GAP mmol/L 8.0 6.0 9.0  < > 11.0   < > = values in this interval not displayed.  Estimated Creatinine Clearance: 65.7 mL/min (by C-G formula based on SCr of 0.89 mg/dL).            Results from last 7 days  Lab Units 09/17/18  0320 09/16/18  0441 09/15/18  0227 09/14/18  0331 09/13/18  0536   WBC 10*3/mm3 19.33* 20.85* 19.74* 25.94* 26.18*   HEMOGLOBIN g/dL 13.4* 13.8 13.3* 14.2 13.8   HEMATOCRIT % 39.5 40.8 39.4 42.1 40.4   PLATELETS 10*3/mm3 281 311 268 287 335       Results from last 7 days  Lab Units 09/13/18  1154   INR  1.02       Culture Data:   No results found for: BLOODCX  No results found for: URINECX  No results found for: RESPCX  No results found for: WOUNDCX  No results found for: STOOLCX  No components found for: BODYFLD    Radiology Data:   Imaging Results (last 24 hours)     Procedure Component Value Units Date/Time    XR Chest 1 View [[id number]] Collected:  09/17/18 0517     Updated:  09/17/18 0536    Narrative:         Chest single view on  9/17/2018     CLINICAL INDICATION: Respiratory failure    COMPARISON: 9/15/2018    FINDINGS: Heart is upper limits normal for size. There is a small  right pleural effusion. There has been no significant change in  left greater than right opacities consistent with asymmetric  edema and/or pneumonia.      Impression:       No significant change in the  appearance of the chest.    Electronically signed by:  Bin Moeller  9/17/2018 5:35 AM  CDT Workstation: [id number]          ABG:    Results from last 7 days  Lab Units 09/15/18  1931   PH, ARTERIAL pH units 7.455*   PO2 ART mm Hg 62.3*   PCO2, ARTERIAL mm Hg 45.0   HCO3 ART mmol/L 31.6*       I have reviewed the patient's current medications.     Assessment/Plan     Hospital Problem List     * (Principal)Acute respiratory failure with hypoxia (CMS/HCC)    Pneumonia of right upper lobe due to infectious organism (CMS/HCC)    Sepsis (CMS/HCC)    Hypertension (Chronic)    SUMMER (acute kidney injury) (CMS/HCC)    Pleural effusion (Chronic)    Pulmonary hypertension (Chronic)    Chronic bronchitis (CMS/HCC) (Chronic)          Plan:  1.  Acute hypoxic respiratory failure:  Doing ok.  Subjectively feeling some better.  Appreciate Dr. Pierce's help.  Weaning steroids.    2.  CAP:  Continue antibiotics for 7 days.  Cultures negative.  3.  Large right pleural effusion:  Exudate.  Thoracentesis 9/13.  Path pending.  4.  Pulmonary hypertension  5.  SUMMER:  Resolved.  6.  COPD              Discharge Planning: I expect patient to be discharged to home in 6-7 days.        This document has been electronically signed by Seng Stokes MD on September 17, 2018 4:26 PM

## 2018-09-17 NOTE — PLAN OF CARE
Problem: Surgery Nonspecified (Adult)  Goal: Signs and Symptoms of Listed Potential Problems Will be Absent, Minimized or Managed (Surgery Nonspecified)  Outcome: Ongoing (interventions implemented as appropriate)      Problem: Skin Injury Risk (Adult)  Goal: Identify Related Risk Factors and Signs and Symptoms  Outcome: Ongoing (interventions implemented as appropriate)    Goal: Skin Health and Integrity  Outcome: Ongoing (interventions implemented as appropriate)

## 2018-09-17 NOTE — PROGRESS NOTES
CRITICAL CARE PROGRESS NOTE  Dasha Pierce MD    [facility] CRITICAL CARE  9/17/2018        Trae De La Paz  [id number]  1935  83 y.o. male            LOS: 5 days   Ho Blackmon MD    Chief Complaint/Reason for visit: F/u acute hypoxic respiratory failure    Subjective     Interval History:   History taken from: patient/ chart    Off CPAP for 24hrs but still requiring high FiO2; has been on NRB with NC 3-6lpm. Sats drop quickly into 80s with cough and neb treatment. Brief episode of SVT during my exam, but asymptomatic. Lasix increased ot 40mg q12hr with ~2L net negative. Afebrile. CXR unchanged. Blood sugar elevated.       Review of Systems:   Review of Systems   Constitutional: Positive for fatigue. Negative for fever and unexpected weight change.   Respiratory: Positive for cough and shortness of breath. Negative for chest tightness and wheezing.    Cardiovascular: Negative for chest pain.   Musculoskeletal: Positive for back pain.   Neurological: Positive for weakness.     All systems were reviewed and negative except as noted above in the HPI.    Medical history, surgical history, social history, family history reviewed    Objective     Intake/Output:    Intake/Output Summary (Last 24 hours) at 09/17/18 0734  Last data filed at 09/17/18 0615   Gross per 24 hour   Intake             1340 ml   Output             1775 ml   Net             -435 ml       Nutrition: PO    Infusions:       Respiratory:  FiO2 (%):  [70 %] 70 %  PEEP/CPAP (cm H2O):  [10 cm H20] 10 cm H20    Vital Sign Min/Max for last 24 hours:  Temp  Min: 97.1 °F (36.2 °C)  Max: 98.6 °F (37 °C)   BP  Min: 125/87  Max: 166/78   Pulse  Min: 81  Max: 119   Resp  Min: 19  Max: 24   SpO2  Min: 90 %  Max: 96 %   Flow (L/min)  Min: 3  Max: 15   Weight  Min: 71.4 kg (157 lb 6.5 oz)  Max: 71.4 kg (157 lb 6.5 oz)     Physical Exam:  98.6 °F (37 °C) (Oral) 103 144/75 24 92% 71.4 kg (157 lb 6.5 oz) Body mass index is 23.93  kg/m².  Physical Exam   Constitutional: Vital signs are normal. He appears well-developed and well-nourished.   HENT:   Head: Normocephalic and atraumatic.   Right Ear: Decreased hearing is noted.   Left Ear: Decreased hearing is noted.   Nose: Nose normal.   Eyes: EOM and lids are normal.   Cardiovascular: Normal rate, regular rhythm and normal heart sounds.  PMI is not displaced.  Exam reveals no gallop.    No murmur heard.  Hyperdynamic precordium   Pulmonary/Chest: Effort normal. No tachypnea. No respiratory distress. He has decreased breath sounds in the right lower field and the left lower field. He has no wheezes. He has no rhonchi. He has no rales.   Unable to complete sentence   Abdominal: Soft. Normal appearance and bowel sounds are normal. There is no hepatomegaly. There is no tenderness.   Musculoskeletal:   MAEW, no extremity edema     Vascular Status -  His right foot exhibits no edema. His left foot exhibits no edema.  Neurological: He is alert.   Skin: Skin is warm and dry. No cyanosis. Nails show no clubbing.   Psychiatric: He has a normal mood and affect. His behavior is normal. Judgment normal. Cognition and memory are normal.       Central Lines/PICC: absent     Results Review:  I personally reviewed the patient's new clinical results.   Lab Results (last 24 hours)     Procedure Component Value Units Date/Time    Body Fluid Culture - Body Fluid, Pleural Cavity [[id number]]  (Normal) Collected:  09/13/18 1425    Specimen:  Body Fluid from Pleural Cavity Updated:  09/17/18 0622     BF Culture No growth at 4 days     Gram Stain Result Few (2+) WBCs seen      No organisms seen    Blood Culture - Blood, [[id number]]  (Normal) Collected:  09/12/18 0603    Specimen:  Blood from Arm, Left Updated:  09/17/18 0615     Blood Culture No growth at 5 days    Basic Metabolic Panel [[id number]]  (Abnormal) Collected:  09/17/18 0320    Specimen:  Blood Updated:  09/17/18 0356     Glucose 204 (H) mg/dL      BUN 43 (H)  mg/dL      Creatinine 0.89 mg/dL      Sodium 138 mmol/L      Potassium 4.3 mmol/L      Chloride 96 mmol/L      CO2 34.0 (H) mmol/L      Calcium 9.2 mg/dL      eGFR Non African Amer 82 mL/min/1.73      BUN/Creatinine Ratio 48.3 (H)     Anion Gap 8.0 mmol/L     Narrative:       The MDRD GFR formula is only valid for adults with stable renal function between ages 18 and 70.    CBC & Differential [[id number]] Collected:  09/17/18 0320    Specimen:  Blood Updated:  09/17/18 0337    Narrative:       The following orders were created for panel order CBC & Differential.  Procedure                               Abnormality         Status                     ---------                               -----------         ------                     CBC Auto Differential[[id number]]        Abnormal            Final result                 Please view results for these tests on the individual orders.    CBC Auto Differential [[id number]]  (Abnormal) Collected:  09/17/18 0320    Specimen:  Blood Updated:  09/17/18 0337     WBC 19.33 (H) 10*3/mm3      RBC 4.51 10*6/mm3      Hemoglobin 13.4 (L) g/dL      Hematocrit 39.5 %      MCV 87.6 fL      MCH 29.7 pg      MCHC 33.9 g/dL      RDW 14.3 %      RDW-SD 46.0 (H) fl      MPV 11.0 fL      Platelets 281 10*3/mm3      Neutrophil % 91.9 (H) %      Lymphocyte % 2.1 (L) %      Monocyte % 5.3 %      Eosinophil % 0.0 %      Basophil % 0.0 %      Immature Grans % 0.7 (H) %      Neutrophils, Absolute 17.78 (H) 10*3/mm3      Lymphocytes, Absolute 0.40 (L) 10*3/mm3      Monocytes, Absolute 1.02 (H) 10*3/mm3      Eosinophils, Absolute 0.00 10*3/mm3      Basophils, Absolute 0.00 10*3/mm3      Immature Grans, Absolute 0.13 (H) 10*3/mm3     Blood Culture - Blood, [[id number]]  (Normal) Collected:  09/12/18 0305    Specimen:  Blood from Arm, Left Updated:  09/17/18 0316     Blood Culture No growth at 5 days    Basic Metabolic Panel [[id number]]  (Abnormal) Collected:  09/16/18 0826    Specimen:  Blood Updated:   09/16/18 0855     Glucose 189 (H) mg/dL      BUN 36 (H) mg/dL      Creatinine 0.89 mg/dL      Sodium 139 mmol/L      Potassium 4.6 mmol/L      Chloride 100 mmol/L      CO2 33.0 (H) mmol/L      Calcium 9.2 mg/dL      eGFR Non African Amer 82 mL/min/1.73      BUN/Creatinine Ratio 40.4 (H)     Anion Gap 6.0 mmol/L     Narrative:       The MDRD GFR formula is only valid for adults with stable renal function between ages 18 and 70.          Results from last 7 days  Lab Units 09/15/18  1931   PH, ARTERIAL pH units 7.455*   PO2 ART mm Hg 62.3*   PCO2, ARTERIAL mm Hg 45.0   HCO3 ART mmol/L 31.6*     Lab Results   Component Value Date    BLOODCX No growth at 5 days 09/12/2018     No results found for: URINECX    I independently reviewed the patient's new imaging, including images and reports.  Imaging Results (last 24 hours)     Procedure Component Value Units Date/Time    XR Chest 1 View [[id number]] Collected:  09/17/18 0517     Updated:  09/17/18 0536    Narrative:         Chest single view on  9/17/2018     CLINICAL INDICATION: Respiratory failure    COMPARISON: 9/15/2018    FINDINGS: Heart is upper limits normal for size. There is a small  right pleural effusion. There has been no significant change in  left greater than right opacities consistent with asymmetric  edema and/or pneumonia.      Impression:       No significant change in the appearance of the chest.    Electronically signed by:  Bin Moeller  9/17/2018 5:35 AM  CDT Workstation: [id number]            All medications reviewed.     amLODIPine 5 mg Oral Daily   budesonide-formoterol 2 puff Inhalation BID - RT   ceftriaxone 1 g Intravenous Q24H   enoxaparin 40 mg Subcutaneous Q24H   famotidine 20 mg Oral Daily   furosemide 20 mg Intravenous Q12H   ipratropium-albuterol 3 mL Nebulization 4x Daily - RT   methylPREDNISolone sodium succinate 40 mg Intravenous Q8H   tamsulosin 0.4 mg Oral Nightly         Assessment/Plan     ASSESSMENT:  # Acute hypoxemic  respiratory failure/ ARDS  # Large exudative right pleural effusion- s/p 1.6L thoracentesis on 9/13  # CAP- strep pnuemo/ legionella negative; culture NGTD  # HTN  # Presumed COPD with exacerbation  # H/o tobacco use  # BPH  # Mild hyperglycemia, steroid induced  # PH  # Mild contraction alkalosis      PLAN:  -O2 to keep sats >88%. Prefer HFNC or NRB.  -Bipap prn WOB or refractory hypoxemia  -Decrease solumedrol 40mg q12hrs  -Cont Symbicort 160 BID and schedule duonebs  -Albuterol prn  -Cont ceftriaxone x 7d  -F/u pleural fluid cytology  -Decrease lasix back to 20mg IV q12 and monitor contraction alkalosis. Strict I&Os.  -Cont home norvasc, but may require additional anti-HTN  -Hydralazine IV prn SBP >180  -Will need repeat TTE with PH protocol read by Dr. Rowell at some point to f/u PH  -Add SSI  -Diet as tolerated but make NPO if decompensates  -Attempt OOB (dangle first) as tolerated  -PPX: lovenox and pepcid  -FULL CODE    D/w RN and RT    Addendum: Contacted by Dr. Conteh regarding cytology from thoracentesis.  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    Critical Care Time Spent: 26 minutes  I personally provided care to this critically ill patient as documented above.  Critical care time does not include time spent on separately billed procedures.  None of my critical care time was concurrent with other critical care providers.         This document has been electronically signed by Dasha Pierce MD on September 17, 2018 7:34 AM      [phone]    Dictated using Dragon

## 2018-09-17 NOTE — CONSULTS
Adult Nutrition  Assessment    Patient Name:  Trae De La Paz  YOB: 1935  MRN: [id number]  Admit Date:  9/11/2018    Assessment Date:  9/17/2018    Comments:   Pt currently on Non-rebreather for REsp failure; ARDS; and CAP.  He had a large Pleural effusion and is s/p Thoracentesis with 1.6 liters removed.  He reports eating well pta.   Minimal intake at this time with resp distress.  Will add Ensure and milk and monitor po.            Reason for Assessment     Row Name 09/17/18 1352          Reason for Assessment    Reason For Assessment nurse/nurse practitioner consult     Diagnosis pulmonary disease     Identified At Risk by Screening Criteria other (see comments)   On Non rebreather               Nutrition/Diet History     Row Name 09/17/18 1355          Nutrition/Diet History    Typical Food/Fluid Intake Pt with rebreather on.  He said he isn't able to eat much due to difficulty breathing.  He is drinking milk because it helps with his reflux.  He is willing to try Ensure               Labs/Tests/Procedures/Meds     Row Name 09/17/18 6179          Labs/Procedures/Meds    Lab Results Reviewed reviewed, pertinent        Diagnostic Tests/Procedures    Diagnostic Test/Procedure Reviewed reviewed, pertinent     Diagnostic Test/Procedures Comments s/p thoracentesis with 1.6L removed        Medications    Pertinent Medications Reviewed reviewed, pertinent             Physical Findings     Row Name 09/17/18 1358          Physical Findings    Overall Physical Appearance on oxygen therapy             Estimated/Assessed Needs     Row Name 09/17/18 9178          Calculation Measurements    Weight Used For Calculations 71.2 kg (157 lb)        Estimated/Assessed Needs    Additional Documentation Additional Requirements (Group);Fluid Requirements (Group);Whittaker-St. Jeor Equation (Group);Protein Requirements (Group);Calorie Requirements (Group);KCAL/KG (Group)        Calorie Requirements    Measured Resting  Energy Expenditure (MREE) 1800 Kcal/day        KCAL/KG    14 Kcal/Kg (kcal) 997.01     15 Kcal/Kg (kcal) 1068.23     18 Kcal/Kg (kcal) 1281.87     20 Kcal/Kg (kcal) 1424.3     25 Kcal/Kg (kcal) 1780.38     30 Kcal/Kg (kcal) 2136.45     35 Kcal/Kg (kcal) 2492.53     40 Kcal/Kg (kcal) 2848.6     45 Kcal/Kg (kcal) 3204.68     50 Kcal/Kg (kcal) 3560.75        [place]-St. Jeor Equation    RMR ([place]-St. Jeor Equation) 1391.65        Protein Requirements    Est Protein Requirement Amount (gms/kg) 1.2 gm protein     Estimated Protein Requirements (gms/day) 85.46        Fluid Requirements    Estimated Fluid Requirements (mL/day) 1800     Estimated Fluid Requirement Method RDA Method     RDA Method (mL) 1800     Poncho-Maximus Method (over 20 kg) 2924.3             Nutrition Prescription Ordered     Row Name 09/17/18 1356          Nutrition Prescription PO    Current PO Diet Regular     Fluid Consistency Thin     Common Modifiers Cardiac             Evaluation of Received Nutrient/Fluid Intake     Row Name 09/17/18 1400 09/17/18 1356       Calculation Measurements    Weight Used For Calculations  -- 71.2 kg (157 lb)       PO Evaluation    Number of Days PO Intake Evaluated 3 days  --    Number of Meals 6  --    % PO Intake 0--25%  --            Evaluation of Prescribed Nutrient/Fluid Intake     Row Name 09/17/18 1356          Calculation Measurements    Weight Used For Calculations 71.2 kg (157 lb)           Electronically signed by:  Kavya Campbell RD  09/17/18 2:08 PM

## 2018-09-17 NOTE — PLAN OF CARE
Problem: Patient Care Overview  Goal: Plan of Care Review  Outcome: Ongoing (interventions implemented as appropriate)   09/17/18 1407 09/17/18 5751   Coping/Psychosocial   Plan of Care Reviewed With caregiver;patient --    Plan of Care Review   Progress --  no change   OTHER   Outcome Summary --  Patients VSS this shift. Oxygen saturation remains between 88-94% on high flow nasal cannula at 5 L/mine and non-rebreather at 100%..     Goal: Individualization and Mutuality  Outcome: Ongoing (interventions implemented as appropriate)    Goal: Discharge Needs Assessment  Outcome: Ongoing (interventions implemented as appropriate)    Goal: Interprofessional Rounds/Family Conf  Outcome: Ongoing (interventions implemented as appropriate)      Problem: Sepsis/Septic Shock (Adult)  Goal: Signs and Symptoms of Listed Potential Problems Will be Absent, Minimized or Managed (Sepsis/Septic Shock)  Outcome: Ongoing (interventions implemented as appropriate)      Problem: Breathing Pattern Ineffective (Adult)  Goal: Effective Oxygenation/Ventilation  Outcome: Ongoing (interventions implemented as appropriate)    Goal: Anxiety/Fear Reduction  Outcome: Ongoing (interventions implemented as appropriate)      Problem: Pneumonia (Adult)  Goal: Signs and Symptoms of Listed Potential Problems Will be Absent, Minimized or Managed (Pneumonia)  Outcome: Ongoing (interventions implemented as appropriate)      Problem: Fall Risk (Adult)  Goal: Absence of Fall  Outcome: Ongoing (interventions implemented as appropriate)      Problem: Surgery Nonspecified (Adult)  Goal: Signs and Symptoms of Listed Potential Problems Will be Absent, Minimized or Managed (Surgery Nonspecified)  Outcome: Ongoing (interventions implemented as appropriate)      Problem: Skin Injury Risk (Adult)  Goal: Identify Related Risk Factors and Signs and Symptoms  Outcome: Ongoing (interventions implemented as appropriate)    Goal: Skin Health and Integrity  Outcome: Ongoing  (interventions implemented as appropriate)

## 2018-09-18 NOTE — PROGRESS NOTES
CRITICAL CARE PROGRESS NOTE  Dasha Pierce MD    [facility] CRITICAL CARE  9/18/2018        Trae De La Paz  [id number]  1935  83 y.o. male            LOS: 6 days   Ho Blackmon MD    Chief Complaint/Reason for visit: F/u acute hypoxic respiratory failure    Subjective     Interval History:   History taken from: patient/ chart    Continues off CPAP but requiring NRB and NC. Attempted HF NC at 15L but sats in mid 80s. States he is feeling better but admits to ongoing dyspnea. Denies cough or sputum. Feels weak and wants to get OOB.  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       Review of Systems:   Review of Systems   Constitutional: Positive for fatigue. Negative for fever and unexpected weight change.   Respiratory: Positive for cough and shortness of breath. Negative for chest tightness and wheezing.    Cardiovascular: Negative for chest pain.   Gastrointestinal: Negative for abdominal pain.   Musculoskeletal: Negative for back pain.   Neurological: Positive for weakness.     All systems were reviewed and negative except as noted above in the HPI.    Medical history, surgical history, social history, family history reviewed    Objective     Intake/Output:    Intake/Output Summary (Last 24 hours) at 09/18/18 0808  Last data filed at 09/18/18 0615   Gross per 24 hour   Intake              700 ml   Output             1825 ml   Net            -1125 ml       Nutrition: PO    Infusions:       Respiratory:       Vital Sign Min/Max for last 24 hours:  Temp  Min: 98.2 °F (36.8 °C)  Max: 98.5 °F (36.9 °C)   BP  Min: 129/72  Max: 180/86   Pulse  Min: 79  Max: 108   Resp  Min: 16  Max: 26   SpO2  Min: 81 %  Max: 96 %   Flow (L/min)  Min: 5  Max: 15   Weight  Min: 71.9 kg (158 lb 8.2 oz)  Max: 71.9 kg (158 lb 8.2 oz)     Physical Exam:  98.5 °F (36.9 °C) (Axillary) 102 170/81 22 (!) 85% 71.9 kg (158 lb 8.2 oz) Body mass  index is 24.1 kg/m².  Physical Exam   Constitutional: Vital signs are normal. He appears well-developed and well-nourished.   HENT:   Head: Normocephalic and atraumatic.   Right Ear: Decreased hearing is noted.   Left Ear: Decreased hearing is noted.   Nose: Nose normal.   Eyes: EOM and lids are normal.   Cardiovascular: Normal rate, regular rhythm and normal heart sounds.  PMI is not displaced.  Exam reveals no gallop.    No murmur heard.  Hyperdynamic precordium   Pulmonary/Chest: Effort normal. No tachypnea. No respiratory distress. He has decreased breath sounds in the right lower field and the left lower field. He has no wheezes. He has no rhonchi. He has no rales.   Unable to complete sentence   Abdominal: Soft. Normal appearance and bowel sounds are normal. There is no hepatomegaly. There is no tenderness.   Musculoskeletal:   MAEW, no extremity edema     Vascular Status -  His right foot exhibits no edema. His left foot exhibits no edema.  Neurological: He is alert.   Skin: Skin is warm and dry. No cyanosis. Nails show no clubbing.   Psychiatric: He has a normal mood and affect. His behavior is normal. Judgment normal. Cognition and memory are normal.       Central Lines/PICC: absent     Results Review:  I personally reviewed the patient's new clinical results.   Lab Results (last 24 hours)     Procedure Component Value Units Date/Time    Body Fluid Culture - Body Fluid, Pleural Cavity [[id number]]  (Normal) Collected:  09/13/18 1425    Specimen:  Body Fluid from Pleural Cavity Updated:  09/18/18 0637     BF Culture No growth at 5 days     Gram Stain Result Few (2+) WBCs seen      No organisms seen    CBC & Differential [[id number]] Collected:  09/18/18 0247    Specimen:  Blood Updated:  09/18/18 0408    Narrative:       The following orders were created for panel order CBC & Differential.  Procedure                               Abnormality         Status                     ---------                                -----------         ------                     Manual Differential[[id number]]          Abnormal            Final result               Scan Slide[[id number]]                                                                  CBC Auto Differential[[id number]]        Abnormal            Final result                 Please view results for these tests on the individual orders.    Manual Differential [[id number]]  (Abnormal) Collected:  09/18/18 0247    Specimen:  Blood Updated:  09/18/18 0408     Neutrophil % 91.0 (H) %      Lymphocyte % 4.0 (L) %      Monocyte % 5.0 %      Neutrophils Absolute 18.65 (H) 10*3/mm3      Lymphocytes Absolute 0.82 10*3/mm3      Monocytes Absolute 1.02 (H) 10*3/mm3      RBC Morphology Normal     WBC Morphology Normal     Platelet Morphology Normal    Basic Metabolic Panel [[id number]]  (Abnormal) Collected:  09/18/18 0247    Specimen:  Blood Updated:  09/18/18 0325     Glucose 170 (H) mg/dL      BUN 43 (H) mg/dL      Creatinine 0.86 mg/dL      Sodium 139 mmol/L      Potassium 4.3 mmol/L      Chloride 97 mmol/L      CO2 35.0 (H) mmol/L      Calcium 9.4 mg/dL      eGFR Non African Amer 85 mL/min/1.73      BUN/Creatinine Ratio 50.0 (H)     Anion Gap 7.0 mmol/L     Narrative:       The MDRD GFR formula is only valid for adults with stable renal function between ages 18 and 70.    CBC Auto Differential [[id number]]  (Abnormal) Collected:  09/18/18 0247    Specimen:  Blood Updated:  09/18/18 0317     WBC 20.49 (H) 10*3/mm3      RBC 4.75 10*6/mm3      Hemoglobin 14.2 g/dL      Hematocrit 42.0 %      MCV 88.4 fL      MCH 29.9 pg      MCHC 33.8 g/dL      RDW 14.6 (H) %      RDW-SD 47.3 (H) fl      MPV 10.7 fL      Platelets 302 10*3/mm3     POC Glucose Once [[id number]]  (Abnormal) Collected:  09/17/18 1625    Specimen:  Blood Updated:  09/17/18 1636     Glucose 205 (H) mg/dL      Comment: Sliding Scale AdminOperator: [id number] BETTS JOSHUAMeter ID: [id number]       POC Glucose Once [[id number]]   (Abnormal) Collected:  09/17/18 1158    Specimen:  Blood Updated:  09/17/18 1222     Glucose 213 (H) mg/dL      Comment: Sliding Scale AdminOperator: [id number] ROXANE Callejas ID: [id number]             Results from last 7 days  Lab Units 09/15/18  1931   PH, ARTERIAL pH units 7.455*   PO2 ART mm Hg 62.3*   PCO2, ARTERIAL mm Hg 45.0   HCO3 ART mmol/L 31.6*     Lab Results   Component Value Date    BLOODCX No growth at 5 days 09/12/2018     No results found for: URINECX    I independently reviewed the patient's new imaging, including images and reports.  Imaging Results (last 24 hours)     ** No results found for the last 24 hours. **            All medications reviewed.     amLODIPine 5 mg Oral Daily   budesonide-formoterol 2 puff Inhalation BID - RT   ceftriaxone 1 g Intravenous Q24H   enoxaparin 40 mg Subcutaneous Q24H   famotidine 20 mg Oral Daily   furosemide 20 mg Intravenous Q12H   insulin aspart 0-7 Units Subcutaneous 4x Daily AC & at Bedtime   ipratropium-albuterol 3 mL Nebulization 4x Daily - RT   methylPREDNISolone sodium succinate 40 mg Intravenous Q12H   tamsulosin 0.4 mg Oral Nightly         Assessment/Plan     ASSESSMENT:  # Acute hypoxemic respiratory failure/ ARDS  # Large exudative right pleural effusion- s/p 1.6L thoracentesis on 9/13  # CAP- strep pnuemo/ legionella negative; culture NGTD  # HTN  # Presumed COPD with exacerbation  # H/o tobacco use  # BPH  # Mild hyperglycemia, steroid induced  # PH  # Mild contraction alkalosis- diuretic induced; hemoconcentration  # Malignant pleural effusion- ADC of lung by immunostaining c/w metastatic lung cancerdisease      PLAN:  -O2 to keep sats >88%. Prefer HFNC or NRB.  -Bipap prn WOB or refractory hypoxemia  -Change solumedrol to prednisone 40mg PO daily  -Cont Symbicort 160 BID and schedule duonebs  -Albuterol prn  -Complete ceftriaxone x 7d  -F/u pleural fluid cytology  -Stop lasix  -Cont home norvasc, but may require additional  anti-HTN  -Hydralazine IV prn SBP >180  -Will need repeat TTE with PH protocol read by Dr. Rowell at some point to f/u PH  -SSI  -Diet as tolerated but make NPO if decompensates  -Attempt OOB (dangle first) as tolerated  -PPX: lovenox and pepcid  -FULL CODE    I did discuss the results of his pleural fluid cytology with him.  I explained that it is showing adenocarcinoma, but the primary remains unknown area and certainly this could be a lung primary, but he also has a history of prostate cancer.  I explained that the presence of these cancer cells in pleural fluid indicates metastatic disease, which is incurable.  I am concerned that the infiltrates on his chest x-ray are representing lymphangitic spread of his malignancy, which would coincide with his failure to improve despite appropriate therapy for pneumonia.  I recommended that we await the final cytology and once the origin is known, we can request oncology to see him.  I explained that for metastatic cancer the treatment is palliative chemotherapy, but he is currently not in acute condition to receive this therapy.  I am concerned that if his acute respiratory failure is indeed due to his malignancy, he likely will not survive this hospitalization.  I did explain that if he chooses to not undergo cancer directed therapy, we would recommend a palliative approach focusing on comfort.  He did express that if he is going to die, he wants to die comfortably and requested that I ensure this.  He also requested that his wife not be spoken to by herself as she is very emotionally fragile.  I have recommended that we reconvene once more information is known but that his wife be made aware of this diagnosis in his presence.    D/w RN     Addendum: Contacted by Dr. Conteh.  Immuno staining is consistent with adenocarcinoma from a lung primary.  He did not think there is any possibility it could be related to his history of prostate cancer.  I will go ahead and  consult oncology.    Critical Care Time Spent: 32 minutes  I personally provided care to this critically ill patient as documented above.  Critical care time does not include time spent on separately billed procedures.  None of my critical care time was concurrent with other critical care providers.         This document has been electronically signed by Dasha Pierce MD on September 18, 2018 8:08 AM      [phone]    Dictated using Dragon

## 2018-09-18 NOTE — PROGRESS NOTES
[facility] Medicine Services  INPATIENT PROGRESS NOTE    Length of Stay: 6  Date of Admission: 9/11/2018  Primary Care Physician: Félix Tran MD    Subjective   Chief Complaint:  Shortness of breath  HPI:  Patient is not feeling as well today.  He is tearful.  He has conversational dyspnea.    Review of Systems   Constitutional: Positive for fatigue. Negative for appetite change, chills and fever.   Respiratory: Positive for cough and shortness of breath. Negative for chest tightness.    Cardiovascular: Negative for chest pain, palpitations and leg swelling.   Gastrointestinal: Negative for abdominal pain, constipation, diarrhea, nausea and vomiting.   Skin: Negative for wound.   Neurological: Positive for weakness. Negative for dizziness, light-headedness, numbness and headaches.        All pertinent negatives and positives are as above. All other systems have been reviewed and are negative unless otherwise stated.     Objective    Temp:  [97.8 °F (36.6 °C)-98.5 °F (36.9 °C)] 97.8 °F (36.6 °C)  Heart Rate:  [] 101  Resp:  [16-26] 22  BP: (129-177)/(72-89) 145/83      Physical Exam   Constitutional: He appears well-developed and well-nourished.   HENT:   Head: Normocephalic and atraumatic.   Eyes: Pupils are equal, round, and reactive to light. EOM are normal.   Neck: Normal range of motion. Neck supple.   Cardiovascular: Normal rate, regular rhythm, normal heart sounds and intact distal pulses.  Exam reveals no gallop and no friction rub.    No murmur heard.  Pulmonary/Chest: Accessory muscle usage present. No respiratory distress. He has decreased breath sounds. He has no wheezes. He has no rales. He exhibits no tenderness.   Conversational dyspnea   Abdominal: Soft. Bowel sounds are normal. He exhibits no distension. There is no tenderness.   Psychiatric: He has a normal mood and affect. His behavior is normal.   Vitals reviewed.          Results Review:  I  have reviewed the labs, radiology results, and diagnostic studies.    Laboratory Data:     Results from last 7 days  Lab Units 09/18/18  0247 09/17/18  0320 09/16/18  0826  09/12/18  0039   SODIUM mmol/L 139 138 139  < > 141   POTASSIUM mmol/L 4.3 4.3 4.6  < > 4.8   CHLORIDE mmol/L 97 96 100  < > 101   CO2 mmol/L 35.0* 34.0* 33.0*  < > 29.0   BUN mg/dL 43* 43* 36*  < > 29*   CREATININE mg/dL 0.86 0.89 0.89  < > 1.31*   GLUCOSE mg/dL 170* 204* 189*  < > 142*   CALCIUM mg/dL 9.4 9.2 9.2  < > 10.0   BILIRUBIN mg/dL  --   --   --   --  0.5   ALK PHOS U/L  --   --   --   --  120   ALT (SGPT) U/L  --   --   --   --  26   AST (SGOT) U/L  --   --   --   --  28   ANION GAP mmol/L 7.0 8.0 6.0  < > 11.0   < > = values in this interval not displayed.  Estimated Creatinine Clearance: 68.5 mL/min (by C-G formula based on SCr of 0.86 mg/dL).            Results from last 7 days  Lab Units 09/18/18  0247 09/17/18  0320 09/16/18  0441 09/15/18  0227 09/14/18  0331   WBC 10*3/mm3 20.49* 19.33* 20.85* 19.74* 25.94*   HEMOGLOBIN g/dL 14.2 13.4* 13.8 13.3* 14.2   HEMATOCRIT % 42.0 39.5 40.8 39.4 42.1   PLATELETS 10*3/mm3 302 281 311 268 287       Results from last 7 days  Lab Units 09/13/18  1154   INR  1.02       Culture Data:   No results found for: BLOODCX  No results found for: URINECX  No results found for: RESPCX  No results found for: WOUNDCX  No results found for: STOOLCX  No components found for: BODYFLD    Radiology Data:   Imaging Results (last 24 hours)     ** No results found for the last 24 hours. **          ABG:    Results from last 7 days  Lab Units 09/15/18  1931   PH, ARTERIAL pH units 7.455*   PO2 ART mm Hg 62.3*   PCO2, ARTERIAL mm Hg 45.0   HCO3 ART mmol/L 31.6*       I have reviewed the patient's current medications.     Assessment/Plan     Hospital Problem List     * (Principal)Acute respiratory failure with hypoxia (CMS/HCC)    Pneumonia of right upper lobe due to infectious organism (CMS/HCC)    Sepsis (CMS/HCC)     Hypertension (Chronic)    SUMMER (acute kidney injury) (CMS/HCC)    Pleural effusion (Chronic)    Pulmonary hypertension (Chronic)    Chronic bronchitis (CMS/HCC) (Chronic)          Plan:  1.  Acute hypoxic respiratory failure:  Not feeling as well today  2.  CAP:  Continue antibiotics for 7 days.  Cultures negative.  3.  Large right pleural effusion:  Malignant.  Preliminary pathology shows adenocarcinoma.  4.  Pulmonary hypertension  5.  SUMMER:  Resolved.  6.  COPD    Lengthy discussion with patient and his wife, then another discussion with patient, his wife, and their son.  I explained the diagnosis and the prognosis.  He stated to me during both of those conversations, as he did to Dr Pierce earlier, that he wanted to be comfortable.  He wants to hear the final diagnosis and options, but will likely opt for comfort measures and forego any chemotherapy.          Discharge Planning: I expect patient to be discharged to home in 6-7 days.        This document has been electronically signed by Seng Stokes MD on September 18, 2018 4:18 PM

## 2018-09-18 NOTE — CONSULTS
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  Patient had a CT angiogram done on September 13, 2018 that showed large pleural effusion on right side along with pneumonia.  Patient underwent ultrasound-guided thoracentesis on September 13, 2018 with removal of 1600 mL of fluid.  Pleural fluid cytology came back positive for adenocarcinoma with immunohistochemistry consistent with lung origin.  Hematology oncology has been consulted regarding recommendation for adenocarcinoma of lung with malignant pleural effusion.  Patient complains of fatigue.  Complains of shortness of breath.  Complains of worsening back pain about 2-3 weeks prior to hospital admission.  Denies any new lymph node enlargement.  Denies any significant weight loss recently.  States his appetite has been poor recently.  Denies any bleeding.  Complains of intermittent headaches since hospital admission.  Denies any new skin lesion.  Denies any prior history of malignancy.      PAST MEDICAL HISTORY:    Past Medical History:   Diagnosis Date   • Hypertension        PAST SURGICAL HISTORY:  Past Surgical History:   Procedure Laterality Date   • HERNIA REPAIR         ALLERGIES:    Allergies   Allergen Reactions   • Iodine Hives       SOCIAL HISTORY:   Social History   Substance Use Topics   • Smoking status: Current Some Day Smoker   • Smokeless tobacco: Not on file      Comment: pt quit 40 years ago   • Alcohol use Yes      Comment: occasionaly       CURRENT MEDICATIONS:    Current Facility-Administered Medications   Medication Dose Route Frequency Provider Last Rate Last Dose   • acetaminophen  (TYLENOL) tablet 650 mg  650 mg Oral Q4H PRN Ho Blackmon MD   650 mg at 09/15/18 0546   • albuterol (PROVENTIL) nebulizer solution 0.083% 2.5 mg/3mL  2.5 mg Nebulization Q4H PRN Dasha Pierce MD       • amLODIPine (NORVASC) tablet 5 mg  5 mg Oral Daily Ho Blackmon MD   5 mg at 09/18/18 0839   • budesonide-formoterol (SYMBICORT) 160-4.5 MCG/ACT inhaler 2 puff  2 puff Inhalation BID - RT Dasha Pierce MD   2 puff at 09/18/18 0643   • cefTRIAXone (ROCEPHIN) 1 g/100 mL 0.9% NS (MBP)  1 g Intravenous Q24H Ho Blackmon MD   1 g at 09/18/18 0544   • dextrose (D50W) 25 g/ 50mL Intravenous Solution 25 g  25 g Intravenous Q15 Min PRN Dasha Pierce MD       • dextrose (GLUTOSE) oral gel 15 g  15 g Oral Q15 Min PRN Dasha Pierce MD       • enoxaparin (LOVENOX) syringe 40 mg  40 mg Subcutaneous Q24H Dasha Pierce MD   40 mg at 09/18/18 0839   • famotidine (PEPCID) tablet 20 mg  20 mg Oral Daily Dasha Pierce MD   20 mg at 09/18/18 0839   • glucagon (human recombinant) (GLUCAGEN DIAGNOSTIC) injection 1 mg  1 mg Subcutaneous PRN Dasha Pierce MD       • hydrALAZINE (APRESOLINE) injection 10 mg  10 mg Intravenous Q6H PRN Dasha Pierce MD       • insulin aspart (novoLOG) injection 0-7 Units  0-7 Units Subcutaneous 4x Daily AC & at Bedtime Dasha Pierce MD   2 Units at 09/18/18 1125   • ipratropium-albuterol (DUO-NEB) nebulizer solution 3 mL  3 mL Nebulization 4x Daily - RT Dasha Pierce MD   3 mL at 09/18/18 0643   • LORazepam (ATIVAN) injection 0.5 mg  0.5 mg Intravenous Q6H PRN Ariel Moreland MD   0.5 mg at 09/15/18 1526   • morphine injection 2 mg  2 mg Intravenous Q4H PRN Seng Stokes MD       • ondansetron (ZOFRAN) injection 4 mg  4 mg Intravenous Q6H PRN Ho Blackmon MD   4 mg at 09/15/18 6907   • [START ON 9/19/2018] predniSONE (DELTASONE) tablet 40 mg  40 mg Oral Daily With Breakfast Dasha Pierce MD       • promethazine (PHENERGAN) injection 12.5 mg  12.5 mg  "Intravenous Q6H PRN Ariel Moreland MD   12.5 mg at 09/16/18 2036   • sodium chloride 0.9 % flush 1-10 mL  1-10 mL Intravenous PRN Ho Blackmon MD       • sodium chloride 0.9 % flush 10 mL  10 mL Intravenous PRN Guillermo Stokes, DO   10 mL at 09/17/18 0447   • tamsulosin (FLOMAX) 24 hr capsule 0.4 mg  0.4 mg Oral Nightly Ho Blackmon MD   0.4 mg at 09/17/18 2148   • traZODone (DESYREL) half tablet 25 mg  25 mg Oral Nightly PRN Ariel Moreland MD   25 mg at 09/17/18 2148        HOME MEDICATIONS:   No current facility-administered medications on file prior to encounter.      No current outpatient prescriptions on file prior to encounter.       FAMILY HISTORY:    History reviewed. No pertinent family history.    REVIEW OF SYSTEMS:      CONSTITUTIONAL:  Complains of fatigue. Denies any fever, chills or weight loss.     EYES: No visual disturbances. No discharge. No new lesions    ENMT:  No epistaxis, mouth sores or difficulty swallowing.    RESPIRATORY: Positive for shortness of breath that started 1 day prior to hospital admission.No new cough or hemoptysis.    CARDIOVASCULAR:  No chest pain or palpitations.    GASTROINTESTINAL:  No abdominal pain nausea, vomiting or blood in the stool.    GENITOURINARY: No Dysuria or Hematuria.    MUSCULOSKELETAL: Positive for chronic back pain, states his back pain got worse 2-3 weeks prior to hospital admission.    LYMPHATICS:  Denies any abnormal swollen glands anywhere in the body.    NEUROLOGICAL : Complains of intermittent headache.  No tingling or numbness. No  dizziness. No seizures or balance problems.    SKIN: No new skin lesions.            PHYSICAL EXAMINATION:      VITAL SIGNS:  /83   Pulse 101   Temp 97.8 °F (36.6 °C) (Axillary)   Resp 22   Ht 172.7 cm (68\")   Wt 71.9 kg (158 lb 8.2 oz)   SpO2 94%   BMI 24.10 kg/m²   1    09/16/18  0506 09/17/18  0500 09/18/18  0615   Weight: 71.8 kg (158 lb 4.6 oz) 71.4 kg (157 lb 6.5 oz) 71.9 kg (158 lb 8.2 oz) "         CONSTITUTIONAL:  Not in any distress.Appears frail.    EYES: Mild conjunctival Pallor. No Icterus. No Pterygium. Extraocular Movements intact.No ptosis.    ENMT:  Normocephalic, Atraumatic.No Facial Asymmetry noted.    NECK:  No adenopathy.Trachea midline. NO JVD.    RESPIRATORY:  Diminished air entry bilaterally.  Rhonchi heard on left side.  Requiring nonrebreather mask.    CARDIOVASCULAR:  S1, S2.Tachycardic, Regular rate and rhythm. No murmur or gallop appreciated.    ABDOMEN:  Soft, obese, nontender. Bowel sounds present in all four quadrants.  No Hepatosplenomegaly appreciated.    MUSCULOSKELETAL:  No edema.No Calf Tenderness.    NEUROLOGIC:    No  Motor  deficit appreciated. Cranial Nerves 2-12 grossly intact.    SKIN : No new skin lesion identified. Skin is warm  to touch.    LYMPHATICS: No new enlarged lymph nodes in neck or supraclavicular area.    PSYCHIATRY: Alert, awake and oriented ×3.          DIAGNOSTIC DATA:    WBC   Date Value Ref Range Status   09/18/2018 20.49 (H) 3.20 - 9.80 10*3/mm3 Final     RBC   Date Value Ref Range Status   09/18/2018 4.75 4.37 - 5.74 10*6/mm3 Final     Hemoglobin   Date Value Ref Range Status   09/18/2018 14.2 13.7 - 17.3 g/dL Final     Hematocrit   Date Value Ref Range Status   09/18/2018 42.0 39.0 - 49.0 % Final     MCV   Date Value Ref Range Status   09/18/2018 88.4 80.0 - 98.0 fL Final     MCH   Date Value Ref Range Status   09/18/2018 29.9 26.5 - 34.0 pg Final     MCHC   Date Value Ref Range Status   09/18/2018 33.8 31.5 - 36.3 g/dL Final     RDW   Date Value Ref Range Status   09/18/2018 14.6 (H) 11.5 - 14.5 % Final     RDW-SD   Date Value Ref Range Status   09/18/2018 47.3 (H) 35.1 - 43.9 fl Final     MPV   Date Value Ref Range Status   09/18/2018 10.7 8.0 - 12.0 fL Final     Platelets   Date Value Ref Range Status   09/18/2018 302 150 - 450 10*3/mm3 Final     Neutrophil %   Date Value Ref Range Status   09/17/2018 91.9 (H) 37.0 - 80.0 % Final     Lymphocyte  %   Date Value Ref Range Status   09/17/2018 2.1 (L) 10.0 - 50.0 % Final     Monocyte %   Date Value Ref Range Status   09/17/2018 5.3 0.0 - 12.0 % Final     Eosinophil %   Date Value Ref Range Status   09/17/2018 0.0 0.0 - 7.0 % Final     Basophil %   Date Value Ref Range Status   09/17/2018 0.0 0.0 - 2.0 % Final     Immature Grans %   Date Value Ref Range Status   09/17/2018 0.7 (H) 0.0 - 0.5 % Final     Neutrophils, Absolute   Date Value Ref Range Status   09/17/2018 17.78 (H) 2.00 - 8.60 10*3/mm3 Final     Neutrophils Absolute   Date Value Ref Range Status   09/18/2018 18.65 (H) 2.00 - 8.60 10*3/mm3 Final     Lymphocytes, Absolute   Date Value Ref Range Status   09/17/2018 0.40 (L) 0.60 - 4.20 10*3/mm3 Final     Monocytes, Absolute   Date Value Ref Range Status   09/17/2018 1.02 (H) 0.00 - 0.90 10*3/mm3 Final     Eosinophils, Absolute   Date Value Ref Range Status   09/17/2018 0.00 0.00 - 0.70 10*3/mm3 Final     Basophils, Absolute   Date Value Ref Range Status   09/17/2018 0.00 0.00 - 0.20 10*3/mm3 Final     Immature Grans, Absolute   Date Value Ref Range Status   09/17/2018 0.13 (H) 0.00 - 0.02 10*3/mm3 Final     Glucose   Date Value Ref Range Status   09/18/2018 170 (H) 60 - 100 mg/dL Final     Sodium   Date Value Ref Range Status   09/18/2018 139 137 - 145 mmol/L Final     Potassium   Date Value Ref Range Status   09/18/2018 4.3 3.5 - 5.1 mmol/L Final     CO2   Date Value Ref Range Status   09/18/2018 35.0 (H) 22.0 - 31.0 mmol/L Final     Chloride   Date Value Ref Range Status   09/18/2018 97 95 - 110 mmol/L Final     Anion Gap   Date Value Ref Range Status   09/18/2018 7.0 5.0 - 15.0 mmol/L Final     Creatinine   Date Value Ref Range Status   09/18/2018 0.86 0.70 - 1.30 mg/dL Final     BUN   Date Value Ref Range Status   09/18/2018 43 (H) 7 - 21 mg/dL Final     BUN/Creatinine Ratio   Date Value Ref Range Status   09/18/2018 50.0 (H) 7.0 - 25.0 Final     Calcium   Date Value Ref Range Status   09/18/2018 9.4  8.4 - 10.2 mg/dL Final     eGFR Non  Amer   Date Value Ref Range Status   09/18/2018 85 42 - 98 mL/min/1.73 Final     No results found for: IRON, TIBC, LABIRON, FERRITIN, [id number], FOLATE  No results found for: , LABCA2, AFPTM, HCGQUANT, , CHROMGRNA, [id number], CEA, REFLABREPO]          Radiology Data :  CT angiogram of chest with contrast done on September 13, 2018 showed:  FINDINGS: There is a large right pleural effusion. There is  slight shift of the heart and mediastinum to the left related to  this large right pleural effusion. There is some partial aeration  of the right upper and right middle lobe with complete  atelectasis of the right lower lobe. There are areas of  atelectasis in the right upper and right middle lobe as well.  There is patchy groundglass and airspace opacity in the left lung  consistent with edema and/or pneumonia. There is anterior to  posterior narrowing of the trachea and mainstem bronchi  suggesting tracheobronchomalacia. There is no left pleural  effusion or pericardial effusion. There is prominence of the main  and central pulmonary arteries suggesting pulmonary arterial  hypertension. There are no filling defects within the pulmonary  arteries to suggest a pulmonary embolus. Limited visualized upper  abdomen is unremarkable. There is no thoracic adenopathy.  Degenerative changes are noted in the spine.     IMPRESSION:  1. No evidence of pulmonary embolus.  2. Large right pleural effusion with atelectasis of much of the  right lung. Consider thoracentesis.  3. Patchy left-sided opacity consistent with edema and/or  Pneumonia.      PATHOLOGY:  Cytology report from September 13, 2018 showed:  Final Diagnosis   RIGHT PLEURAL FLUID, THORACENTESIS:  POSITIVE FOR METASTATIC ADENOCARCINOMA, LUNG PRIMARY   Electronically signed by Scott Conteh MD on 9/18/2018 at 1319           ASSESSMENT AND PLAN:      1.  Lung adenocarcinoma, stage IV with malignant pleural  effusion:  -CT angiogram done on September 13, 2018 showed large pleural effusion on right side for which patient underwent thoracentesis.  -Pleural fluid cytology came back positive for adenocarcinoma.  Immunohistochemistry done earlier today shows positivity for neck sine, TTF-1 and CK 7 consistent with lung origin.  -Result of CT scan, pathology and staging were discussed with patient and his family consisting of life, stepson in the room.  -It was discussed with them being stage IV it is not curable condition.  -Treatment option consisting of immunotherapy or oral chemotherapy if tumor shows any targetable mutation or high expression of PDL 1 versus hospice care were discussed with patient.  -It was also discussed with the patient due to his age and performance status, he would not be able to tolerate conventional cytotoxic chemotherapy.  -Patient wants to discuss with his family before making final decision about further treatment plan.  -We will discuss with patient and family again tomorrow.    2.  Pneumonia: Continue with management as per medical team    3.  Hypertension    4.  Leukocytosis: Most likely reactive plus possible leukemoid reaction secondary to malignancy.  We will monitored with CBC for now.        Thank you for this consultation.          Regino Dallas MD  9/18/2018  5:10 PM          EMR Dragon/Transcription disclaimer:   Much of this encounter note is an electronic transcription/translation of spoken language to printed text. The electronic translation of spoken language may permit erroneous, or at times, nonsensical words or phrases to be inadvertently transcribed; Although I have reviewed the note for such errors, some may still exist.

## 2018-09-18 NOTE — PLAN OF CARE
Problem: Patient Care Overview  Goal: Plan of Care Review  Outcome: Ongoing (interventions implemented as appropriate)   09/18/18 0613 09/18/18 1200 09/18/18 1735   Coping/Psychosocial   Plan of Care Reviewed With --  patient;family --    Plan of Care Review   Progress no change --  --    OTHER   Outcome Summary --  --  Patient remained stable today. HFNC was increased to 15 L/min, and patient remains on 100% NRB. Dr. Pierce discussed with patient this morning about patho results showing adenocarcinoma from thoracentesis fluids. Dr. Stokes discussed with family. Dr. Dallas was consulted.      Goal: Individualization and Mutuality  Outcome: Ongoing (interventions implemented as appropriate)    Goal: Interprofessional Rounds/Family Conf  Outcome: Ongoing (interventions implemented as appropriate)      Problem: Sepsis/Septic Shock (Adult)  Goal: Signs and Symptoms of Listed Potential Problems Will be Absent, Minimized or Managed (Sepsis/Septic Shock)  Outcome: Ongoing (interventions implemented as appropriate)      Problem: Breathing Pattern Ineffective (Adult)  Goal: Effective Oxygenation/Ventilation  Outcome: Ongoing (interventions implemented as appropriate)    Goal: Anxiety/Fear Reduction  Outcome: Ongoing (interventions implemented as appropriate)      Problem: Pneumonia (Adult)  Goal: Signs and Symptoms of Listed Potential Problems Will be Absent, Minimized or Managed (Pneumonia)  Outcome: Ongoing (interventions implemented as appropriate)      Problem: Fall Risk (Adult)  Goal: Absence of Fall  Outcome: Ongoing (interventions implemented as appropriate)      Problem: Surgery Nonspecified (Adult)  Goal: Signs and Symptoms of Listed Potential Problems Will be Absent, Minimized or Managed (Surgery Nonspecified)  Outcome: Ongoing (interventions implemented as appropriate)      Problem: Skin Injury Risk (Adult)  Goal: Identify Related Risk Factors and Signs and Symptoms  Outcome: Ongoing (interventions implemented as  appropriate)    Goal: Skin Health and Integrity  Outcome: Ongoing (interventions implemented as appropriate)

## 2018-09-18 NOTE — CONSULTS
Nutrition Services    Patient Name:  Trae De La Paz  YOB: 1935  MRN: [id number]  Admit Date:  9/11/2018    Pt resting.  Per staff Pt was told this am that the results of the Pleural Fluid cytology showed Adenocarcinoma with ? Primary.  The Presence of these cells in the Pleural effusion indicated metastatic Dz.   Final Path report is still pending.  Will wait for that to finalize tx plans. Rd will continue to monitor tx plans    Electronically signed by:  Kavya Campbell RD  09/18/18 12:27 PM

## 2018-09-18 NOTE — PLAN OF CARE
Problem: Patient Care Overview  Goal: Plan of Care Review  Outcome: Ongoing (interventions implemented as appropriate)   09/18/18 0613   Coping/Psychosocial   Plan of Care Reviewed With patient   Plan of Care Review   Progress no change   OTHER   Outcome Summary patient remained stable throughout the night with no complications. Nasal Canula remains at 5 L with a non-rebreather at 100%     Goal: Individualization and Mutuality  Outcome: Ongoing (interventions implemented as appropriate)    Goal: Discharge Needs Assessment  Outcome: Ongoing (interventions implemented as appropriate)    Goal: Interprofessional Rounds/Family Conf  Outcome: Ongoing (interventions implemented as appropriate)      Problem: Sepsis/Septic Shock (Adult)  Goal: Signs and Symptoms of Listed Potential Problems Will be Absent, Minimized or Managed (Sepsis/Septic Shock)  Outcome: Ongoing (interventions implemented as appropriate)      Problem: Breathing Pattern Ineffective (Adult)  Goal: Effective Oxygenation/Ventilation  Outcome: Ongoing (interventions implemented as appropriate)    Goal: Anxiety/Fear Reduction  Outcome: Ongoing (interventions implemented as appropriate)      Problem: Pneumonia (Adult)  Goal: Signs and Symptoms of Listed Potential Problems Will be Absent, Minimized or Managed (Pneumonia)  Outcome: Ongoing (interventions implemented as appropriate)      Problem: Fall Risk (Adult)  Goal: Absence of Fall  Outcome: Ongoing (interventions implemented as appropriate)      Problem: Surgery Nonspecified (Adult)  Goal: Signs and Symptoms of Listed Potential Problems Will be Absent, Minimized or Managed (Surgery Nonspecified)  Outcome: Ongoing (interventions implemented as appropriate)      Problem: Skin Injury Risk (Adult)  Goal: Identify Related Risk Factors and Signs and Symptoms  Outcome: Ongoing (interventions implemented as appropriate)    Goal: Skin Health and Integrity  Outcome: Ongoing (interventions implemented as  appropriate)

## 2018-09-19 NOTE — PROGRESS NOTES
[facility] Medicine Services  INPATIENT PROGRESS NOTE    Length of Stay: 7  Date of Admission: 9/11/2018  Primary Care Physician: Félix Tran MD    Subjective   Chief Complaint:  Shortness of breath  HPI:  Patient is not feeling as well today.  He is tearful.  He has conversational dyspnea.    Review of Systems   Constitutional: Positive for fatigue. Negative for appetite change, chills and fever.   Respiratory: Positive for cough and shortness of breath. Negative for chest tightness.    Cardiovascular: Negative for chest pain, palpitations and leg swelling.   Gastrointestinal: Negative for abdominal pain, constipation, diarrhea, nausea and vomiting.   Skin: Negative for wound.   Neurological: Positive for weakness. Negative for dizziness, light-headedness, numbness and headaches.        All pertinent negatives and positives are as above. All other systems have been reviewed and are negative unless otherwise stated.     Objective    Temp:  [97.3 °F (36.3 °C)-97.9 °F (36.6 °C)] 97.3 °F (36.3 °C)  Heart Rate:  [] 91  Resp:  [16-32] 24  BP: (125-180)/(46-98) 156/85  FiO2 (%):  [100 %] 100 %      Physical Exam   Constitutional: He appears well-developed and well-nourished.   HENT:   Head: Normocephalic and atraumatic.   Eyes: Pupils are equal, round, and reactive to light. EOM are normal.   Neck: Normal range of motion. Neck supple.   Cardiovascular: Normal rate, regular rhythm, normal heart sounds and intact distal pulses.  Exam reveals no gallop and no friction rub.    No murmur heard.  Pulmonary/Chest: Accessory muscle usage present. No respiratory distress. He has decreased breath sounds. He has no wheezes. He has no rales. He exhibits no tenderness.   Conversational dyspnea   Abdominal: Soft. Bowel sounds are normal. He exhibits no distension. There is no tenderness.   Psychiatric: His behavior is normal. He exhibits a depressed mood.   Vitals  reviewed.          Results Review:  I have reviewed the labs, radiology results, and diagnostic studies.    Laboratory Data:     Results from last 7 days  Lab Units 09/19/18  0256 09/18/18  0247 09/17/18  0320   SODIUM mmol/L 137 139 138   POTASSIUM mmol/L 4.4 4.3 4.3   CHLORIDE mmol/L 96 97 96   CO2 mmol/L 34.0* 35.0* 34.0*   BUN mg/dL 42* 43* 43*   CREATININE mg/dL 0.85 0.86 0.89   GLUCOSE mg/dL 137* 170* 204*   CALCIUM mg/dL 9.2 9.4 9.2   ANION GAP mmol/L 7.0 7.0 8.0     Estimated Creatinine Clearance: 70.1 mL/min (by C-G formula based on SCr of 0.85 mg/dL).            Results from last 7 days  Lab Units 09/19/18  0256 09/18/18  0247 09/17/18  0320 09/16/18  0441 09/15/18  0227   WBC 10*3/mm3 22.44* 20.49* 19.33* 20.85* 19.74*   HEMOGLOBIN g/dL 14.7 14.2 13.4* 13.8 13.3*   HEMATOCRIT % 43.2 42.0 39.5 40.8 39.4   PLATELETS 10*3/mm3 270 302 281 311 268       Results from last 7 days  Lab Units 09/13/18  1154   INR  1.02       Culture Data:   No results found for: BLOODCX  No results found for: URINECX  No results found for: RESPCX  No results found for: WOUNDCX  No results found for: STOOLCX  No components found for: BODYFLD    Radiology Data:   Imaging Results (last 24 hours)     ** No results found for the last 24 hours. **          ABG:    Results from last 7 days  Lab Units 09/15/18  1931   PH, ARTERIAL pH units 7.455*   PO2 ART mm Hg 62.3*   PCO2, ARTERIAL mm Hg 45.0   HCO3 ART mmol/L 31.6*       I have reviewed the patient's current medications.     Assessment/Plan     Hospital Problem List     * (Principal)Acute respiratory failure with hypoxia (CMS/HCC)    Pneumonia of right upper lobe due to infectious organism (CMS/HCC)    Sepsis (CMS/HCC)    Hypertension (Chronic)    SUMMER (acute kidney injury) (CMS/HCC)    Pleural effusion (Chronic)    Pulmonary hypertension (Chronic)    Chronic bronchitis (CMS/HCC) (Chronic)          Plan:  1.  Acute hypoxic respiratory failure:  Doing worse today  2.  CAP:  Will  discontinue antibiotics.  3.  Large right pleural effusion:  Malignant.  Preliminary pathology shows adenocarcinoma, lung primary.  4.  Pulmonary hypertension  5.  SUMMER:  Resolved.  6.  COPD    Mr Brain and I discussed his wishes regarding his course of care moving forward.  He states that he wants comfort measures only.  He doesn't want chemotherapy.  He doesn't want CPR.  He wants to talk to hospice, but understands he will likely not do well enough to go home.  Will transfer to med-surg on comfort measures.          Discharge Planning: I expect patient to be discharged to home in 6-7 days.        This document has been electronically signed by Seng Stokes MD on September 19, 2018 10:56 AM

## 2018-09-19 NOTE — PROGRESS NOTES
HISTORY OF PRESENT ILLNESS:    Complains of shortness of breath.  Had a meeting with hospice earlier today.  Denies any bleeding.    PAST MEDICAL HISTORY:    Past Medical History:   Diagnosis Date   • Hypertension        SOCIAL HISTORY:    Social History   Substance Use Topics   • Smoking status: Current Some Day Smoker   • Smokeless tobacco: Not on file      Comment: pt quit 40 years ago   • Alcohol use Yes      Comment: occasionaly       Surgical History :  Past Surgical History:   Procedure Laterality Date   • HERNIA REPAIR         ALLERGIES:    Allergies   Allergen Reactions   • Iodine Hives         FAMILY HISTORY:  History reviewed. No pertinent family history.        REVIEW OF SYSTEMS:      CONSTITUTIONAL:  Complains of fatigue. Denies any fever, chills or weight loss.     EYES: No visual disturbances. No discharge. No new lesions    ENMT:  No epistaxis, mouth sores or difficulty swallowing.    RESPIRATORY:  No new shortness of breath. No new cough or hemoptysis.    CARDIOVASCULAR:  No chest pain or palpitations.    GASTROINTESTINAL:  No abdominal pain nausea, vomiting or blood in the stool.    GENITOURINARY: No Dysuria or Hematuria.    MUSCULOSKELETAL:  No new back pain or arthralgia..    LYMPHATICS:  Denies any abnormal swollen glands anywhere in the body.    NEUROLOGICAL : No tingling or numbness. No headache or dizziness. No seizures or balance problems.    SKIN: No new skin lesions.    ENDOCRINE : No new heal or cold intolerance. No new polyuria . No polydipsia.  CONSTITUTIONAL:  Complains of fatigue. Denies any fever, chills or weight loss.      EYES: No visual disturbances. No discharge. No new lesions     ENMT:  No epistaxis, mouth sores or difficulty swallowing.     RESPIRATORY: Positive for shortness of breath that started 1 day prior to hospital admission.No new cough or hemoptysis.     CARDIOVASCULAR:  No chest pain or palpitations.     GASTROINTESTINAL:  No abdominal pain nausea, vomiting or  "blood in the stool.     GENITOURINARY: No Dysuria or Hematuria.     MUSCULOSKELETAL: Positive for chronic back pain, states his back pain got worse 2-3 weeks prior to hospital admission.     LYMPHATICS:  Denies any abnormal swollen glands anywhere in the body.     NEUROLOGICAL : Complains of intermittent headache.  No tingling or numbness. No  dizziness. No seizures or balance problems.     SKIN: No new skin lesions.             PHYSICAL EXAMINATION:      VITAL SIGNS:  /85 (BP Location: Right arm, Patient Position: Lying)   Pulse 91   Temp 97.3 °F (36.3 °C) (Axillary)   Resp 24   Ht 172.7 cm (68\")   Wt 72.7 kg (160 lb 3.2 oz)   SpO2 91%   BMI 24.36 kg/m²   1    09/18/18  0615 09/19/18  0639 09/19/18  1048   Weight: 71.9 kg (158 lb 8.2 oz) 71.6 kg (157 lb 13.6 oz) 72.7 kg (160 lb 3.2 oz)         CONSTITUTIONAL: Appears uncomfortable due to shortness of breath.Appears frail.     EYES: Mild conjunctival Pallor. No Icterus. No Pterygium. Extraocular Movements intact.No ptosis.     ENMT:  Normocephalic, Atraumatic.No Facial Asymmetry noted.     NECK:  No adenopathy.Trachea midline. NO JVD.     RESPIRATORY:  Diminished air entry bilaterally.  Rhonchi heard on left side.  Requiring nonrebreather mask.     CARDIOVASCULAR:  S1, S2.Tachycardic, Regular rate and rhythm. No murmur or gallop appreciated.     ABDOMEN:  Soft, obese, nontender. Bowel sounds present in all four quadrants.  No Hepatosplenomegaly appreciated.     MUSCULOSKELETAL:  No edema.No Calf Tenderness.     NEUROLOGIC:    No  Motor  deficit appreciated. Cranial Nerves 2-12 grossly intact.     SKIN : No new skin lesion identified. Skin is warm  to touch.     LYMPHATICS: No new enlarged lymph nodes in neck or supraclavicular area.     PSYCHIATRY: Alert, awake and oriented ×3.      DIAGNOSTIC DATA:    Glucose   Date Value Ref Range Status   09/19/2018 137 (H) 60 - 100 mg/dL Final     Sodium   Date Value Ref Range Status   09/19/2018 137 137 - 145 " mmol/L Final     Potassium   Date Value Ref Range Status   09/19/2018 4.4 3.5 - 5.1 mmol/L Final     CO2   Date Value Ref Range Status   09/19/2018 34.0 (H) 22.0 - 31.0 mmol/L Final     Chloride   Date Value Ref Range Status   09/19/2018 96 95 - 110 mmol/L Final     Anion Gap   Date Value Ref Range Status   09/19/2018 7.0 5.0 - 15.0 mmol/L Final     Creatinine   Date Value Ref Range Status   09/19/2018 0.85 0.70 - 1.30 mg/dL Final     BUN   Date Value Ref Range Status   09/19/2018 42 (H) 7 - 21 mg/dL Final     BUN/Creatinine Ratio   Date Value Ref Range Status   09/19/2018 49.4 (H) 7.0 - 25.0 Final     Calcium   Date Value Ref Range Status   09/19/2018 9.2 8.4 - 10.2 mg/dL Final     eGFR Non  Amer   Date Value Ref Range Status   09/19/2018 87 42 - 98 mL/min/1.73 Final     Lab Results   Component Value Date    WBC 22.44 (H) 09/19/2018    HGB 14.7 09/19/2018    HCT 43.2 09/19/2018    MCV 87.8 09/19/2018     09/19/2018     Lab Results   Component Value Date    NEUTROABS 19.18 (H) 09/19/2018     No results found for: , LABCA2, AFPTM, HCGQUANT, , CHROMGRNA, 5KMNY34GQY, CEA, REFLABREPO        Radiology Data :  CT angiogram of chest with contrast done on September 13, 2018 showed:  FINDINGS: There is a large right pleural effusion. There is  slight shift of the heart and mediastinum to the left related to  this large right pleural effusion. There is some partial aeration  of the right upper and right middle lobe with complete  atelectasis of the right lower lobe. There are areas of  atelectasis in the right upper and right middle lobe as well.  There is patchy groundglass and airspace opacity in the left lung  consistent with edema and/or pneumonia. There is anterior to  posterior narrowing of the trachea and mainstem bronchi  suggesting tracheobronchomalacia. There is no left pleural  effusion or pericardial effusion. There is prominence of the main  and central pulmonary arteries suggesting  pulmonary arterial  hypertension. There are no filling defects within the pulmonary  arteries to suggest a pulmonary embolus. Limited visualized upper  abdomen is unremarkable. There is no thoracic adenopathy.  Degenerative changes are noted in the spine.     IMPRESSION:  1. No evidence of pulmonary embolus.  2. Large right pleural effusion with atelectasis of much of the  right lung. Consider thoracentesis.  3. Patchy left-sided opacity consistent with edema and/or  Pneumonia.        PATHOLOGY:  Cytology report from September 13, 2018 showed:  Final Diagnosis   RIGHT PLEURAL FLUID, THORACENTESIS:  POSITIVE FOR METASTATIC ADENOCARCINOMA, LUNG PRIMARY   Electronically signed by Scott Conteh MD on 9/18/2018 at 1319               ASSESSMENT AND PLAN:       1.  Lung adenocarcinoma, stage IV with malignant pleural effusion:  -CT angiogram done on September 13, 2018 showed large pleural effusion on right side for which patient underwent thoracentesis.  -Pleural fluid cytology came back positive for adenocarcinoma.  Immunohistochemistry done earlier today shows positivity for neck sine, TTF-1 and CK 7 consistent with lung origin.  -Result of CT scan, pathology and staging were discussed with patient and his family consisting of life, stepson in the room.  -It was discussed with them being stage IV it is not curable condition.  -Treatment option consisting of immunotherapy or oral chemotherapy if tumor shows any targetable mutation or high expression of PDL 1 versus hospice care were discussed with patient on 09/18/18.  -After discussion with family, patient has decided not to do any chemotherapy at this point.  -He he wants to go home on hospice.  -Patient had a meeting with hospice earlier today, at that point he wanted hospice to be done as inpatient.  -No patient wants to go home with hospice.  Nurse was notified about this change.     2.  Pneumonia: Continue with management as per medical team     3.   Hypertension     4.  Leukocytosis: Most likely reactive plus possible leukemoid reaction secondary to malignancy.  We will monitored with CBC for now.      Hematology/oncology we will sign off at this point.  Please do not hesitate to call me for any question or concern.      Regino Dallas MD  9/19/2018  4:37 PM        EMR Dragon/Transcription disclaimer:   Much of this encounter note is an electronic transcription/translation of spoken language to printed text. The electronic translation of spoken language may permit erroneous, or at times, nonsensical words or phrases to be inadvertently transcribed; Although I have reviewed the note for such errors, some may still exist.

## 2018-09-19 NOTE — PLAN OF CARE
Problem: Patient Care Overview  Goal: Plan of Care Review  Outcome: Ongoing (interventions implemented as appropriate)   09/19/18 1811   Coping/Psychosocial   Plan of Care Reviewed With patient;spouse   Plan of Care Review   Progress no change   OTHER   Outcome Summary pt arrived to unit with orders for comfort measures. Pt becmae SOA and given PRN morphine. Pt experienced relief temporarily; a second dose was given after 4 hours. Pt has decided that he would like to go home with hospice. Dr Stokes and Dr Dallas are aware and will begin the discharge proces in AM, Pt and spouse in agreement . Pt has had no complaints of pain. Pt has been able to tolerate sips of water and bites of watermelon. Pt curently on hiflow NC and nonrebreather,.     Goal: Individualization and Mutuality  Outcome: Ongoing (interventions implemented as appropriate)      Problem: Dying Patient, Actively (Adult)  Goal: Identify Related Risk Factors and Signs and Symptoms  Outcome: Ongoing (interventions implemented as appropriate)    Goal: Comfort/Pain Control  Outcome: Ongoing (interventions implemented as appropriate)    Goal: Peace/Preservation of Dignity During the Dying Process  Outcome: Ongoing (interventions implemented as appropriate)

## 2018-09-19 NOTE — PROGRESS NOTES
CRITICAL CARE PROGRESS NOTE  Dasha Pierce MD    [facility]ITICAL [facility]  9/19/2018        Trae De La Paz  [id number]  1935  83 y.o. male            LOS: 7 days   Ho Blackmon MD    Chief Complaint/Reason for visit: F/u acute hypoxic respiratory failure    Subjective     Interval History:   History taken from: patient/ chart    Cytology finalized with metastatic adenocarcinoma of lung. Dr. Dallas consulted and met with the pt and his family; consult reviewed and appreciated. Was on NRB + NC most of the day but desaturated into the 70s so placed on CPAP; remained on CPAP overnight. Did not get much rest. Still dyspneic with speech. Denies pain, but admits to thirst. Poor PO intake.    Review of Systems:   Review of Systems   Constitutional: Positive for fatigue. Negative for fever and unexpected weight change.   Respiratory: Positive for shortness of breath. Negative for chest tightness and wheezing.    Cardiovascular: Negative for chest pain.   Gastrointestinal: Negative for abdominal pain.   Musculoskeletal: Negative for back pain.   Neurological: Positive for weakness.     All systems were reviewed and negative except as noted above in the HPI.    Medical history, surgical history, social history, family history reviewed    Objective     Intake/Output:    Intake/Output Summary (Last 24 hours) at 09/19/18 0744  Last data filed at 09/18/18 1800   Gross per 24 hour   Intake              300 ml   Output             1050 ml   Net             -750 ml       Nutrition: PO    Infusions:       Respiratory:  FiO2 (%):  [100 %] (P) 100 %  PEEP/CPAP (cm H2O):  [10 cm H20] (P) 10 cm H20    Vital Sign Min/Max for last 24 hours:  Temp  Min: 97.3 °F (36.3 °C)  Max: 98 °F (36.7 °C)   BP  Min: 125/77  Max: 180/82   Pulse  Min: 83  Max: 101   Resp  Min: 16  Max: 32   SpO2  Min: 85 %  Max: 97 %   Flow (L/min)  Min: 15  Max: 15   Weight  Min: 71.6 kg (157 lb 13.6 oz)  Max: 71.6 kg (157 lb 13.6 oz)      Physical Exam:  97.3 °F (36.3 °C) (Axillary) (P) 94 (P) 139/75 (P) 18 (P) 94% 71.6 kg (157 lb 13.6 oz) Body mass index is 24 kg/m².  Physical Exam   Constitutional: Vital signs are normal. He appears well-developed and well-nourished.   HENT:   Head: Normocephalic and atraumatic.   Right Ear: Decreased hearing is noted.   Left Ear: Decreased hearing is noted.   Nose: Nose normal.   edentulous   Eyes: EOM and lids are normal.   Cardiovascular: Regular rhythm and normal heart sounds.  Tachycardia present.  PMI is not displaced.  Exam reveals no gallop.    No murmur heard.  Hyperdynamic precordium   Pulmonary/Chest: Effort normal. No tachypnea. No respiratory distress. He has decreased breath sounds in the right lower field and the left lower field. He has no wheezes. He has no rhonchi. He has no rales.   Abdominal: Soft. Normal appearance and bowel sounds are normal. There is no hepatomegaly. There is no tenderness.   Musculoskeletal:   MAEW, no extremity edema     Vascular Status -  His right foot exhibits no edema. His left foot exhibits no edema.  Neurological: He is alert.   Skin: Skin is warm and dry. No cyanosis. Nails show no clubbing.   Psychiatric: He has a normal mood and affect. His behavior is normal. Judgment normal. Cognition and memory are normal.       Central Lines/PICC: absent     Results Review:  I personally reviewed the patient's new clinical results.   Lab Results (last 24 hours)     Procedure Component Value Units Date/Time    CBC & Differential [[id number]] Collected:  09/19/18 0256    Specimen:  Blood Updated:  09/19/18 0623    Narrative:       The following orders were created for panel order CBC & Differential.  Procedure                               Abnormality         Status                     ---------                               -----------         ------                     CBC Auto Differential[[id number]]        Abnormal            Final result                 Please view  results for these tests on the individual orders.    CBC Auto Differential [[id number]]  (Abnormal) Collected:  09/19/18 0256    Specimen:  Blood Updated:  09/19/18 0623     WBC 22.44 (H) 10*3/mm3      RBC 4.92 10*6/mm3      Hemoglobin 14.7 g/dL      Hematocrit 43.2 %      MCV 87.8 fL      MCH 29.9 pg      MCHC 34.0 g/dL      RDW 14.3 %      RDW-SD 45.9 (H) fl      MPV 11.0 fL      Platelets 270 10*3/mm3      Neutrophil % 85.5 (H) %      Lymphocyte % 9.6 (L) %      Monocyte % 2.8 %      Eosinophil % 1.2 %      Basophil % 0.1 %      Immature Grans % 0.8 (H) %      Neutrophils, Absolute 19.18 (H) 10*3/mm3      Lymphocytes, Absolute 2.15 10*3/mm3      Monocytes, Absolute 0.62 10*3/mm3      Eosinophils, Absolute 0.28 10*3/mm3      Basophils, Absolute 0.02 10*3/mm3      Immature Grans, Absolute 0.19 (H) 10*3/mm3      nRBC 0.0 /100 WBC     Basic Metabolic Panel [[id number]]  (Abnormal) Collected:  09/19/18 0256    Specimen:  Blood Updated:  09/19/18 0518     Glucose 137 (H) mg/dL      BUN 42 (H) mg/dL      Creatinine 0.85 mg/dL      Sodium 137 mmol/L      Potassium 4.4 mmol/L      Chloride 96 mmol/L      CO2 34.0 (H) mmol/L      Calcium 9.2 mg/dL      eGFR Non African Amer 87 mL/min/1.73      BUN/Creatinine Ratio 49.4 (H)     Anion Gap 7.0 mmol/L     Narrative:       The MDRD GFR formula is only valid for adults with stable renal function between ages 18 and 70.    POC Glucose Once [[id number]]  (Abnormal) Collected:  09/18/18 2033    Specimen:  Blood Updated:  09/18/18 2055     Glucose 154 (H) mg/dL      Comment: Result Not ConfirmedOperator: [id number] [facility] REBECCAMeter ID: [id number]       POC Glucose Once [[id number]]  (Abnormal) Collected:  09/18/18 0645    Specimen:  Blood Updated:  09/18/18 1805     Glucose 157 (H) mg/dL      Comment: Sliding Scale AdminOperator: [id number] [facility] ID: [id number]       POC Glucose Once [[id number]]  (Abnormal) Collected:  09/17/18 2148    Specimen:  Blood Updated:   09/18/18 1805     Glucose 257 (H) mg/dL      Comment: Sliding Scale AdminOperator: [id number] SAMARIA TABARESMeter ID: [id number]       POC Glucose Once [[id number]]  (Abnormal) Collected:  09/18/18 1719    Specimen:  Blood Updated:  09/18/18 1730     Glucose 169 (H) mg/dL      Comment: Sliding Scale AdminOperator: [id number] ROXANE HOFFMANMeter ID: [id number]       Non-gynecologic Cytology [[id number]] Collected:  09/13/18 1359    Specimen:  Body Fluid from Pleural Cavity Updated:  09/18/18 1319     Case Report --     Non-gynecologic Cytology                          Case: [id number]                                  Authorizing Provider:  Ariel Moreland MD         Collected:           09/13/2018 01:59 PM          Ordering Location:     [facility]             Received:            09/14/2018 07:14 AM                                 [facility]                                                   Pathologist:           Scott Conteh MD                                                           Specimen:    Pleural Cavity, 1500MLS, BEATA                                                              Specimen Adequacy Satisfactory for evaluation     Final Diagnosis --     RIGHT PLEURAL FLUID, THORACENTESIS:  POSITIVE FOR METASTATIC ADENOCARCINOMA, LUNG PRIMARY       Comment --     The findings were discussed with Dr. Pierce on September 17, 2018.  The immunostain results were discussed with Dr. Pierce at 1248 on September 18, 2018.       Special Stains --     I ordered a panel of immunostains to confirm adenocarcinoma and to assist with identifying a primary site.    Tumor cells are strongly positive on immunostaining for Napsin A, TTF-1, and CK7.  Tumor cells are variably positive on immunostaining for CK5/6 and calretinin.  CK5/6 and calretinin immunostains strongly karl reactive mesothelial cells.  Tumor cells are negative on immunostaining for CK20.      POC Glucose Once [[id number]]  (Abnormal)  Collected:  09/18/18 1113    Specimen:  Blood Updated:  09/18/18 1124     Glucose 197 (H) mg/dL      Comment: Sliding Scale AdminOperator: [id number] ROXANE Callejas ID: [id number]             Results from last 7 days  Lab Units 09/15/18  1931   PH, ARTERIAL pH units 7.455*   PO2 ART mm Hg 62.3*   PCO2, ARTERIAL mm Hg 45.0   HCO3 ART mmol/L 31.6*     Lab Results   Component Value Date    BLOODCX No growth at 5 days 09/12/2018     No results found for: URINECX    I independently reviewed the patient's new imaging, including images and reports.  Imaging Results (last 24 hours)     ** No results found for the last 24 hours. **            All medications reviewed.     amLODIPine 5 mg Oral Daily   budesonide-formoterol 2 puff Inhalation BID - RT   enoxaparin 40 mg Subcutaneous Q24H   famotidine 20 mg Oral Daily   insulin aspart 0-7 Units Subcutaneous 4x Daily AC & at Bedtime   ipratropium-albuterol 3 mL Nebulization 4x Daily - RT   predniSONE 40 mg Oral Daily With Breakfast   tamsulosin 0.4 mg Oral Nightly         Assessment/Plan     ASSESSMENT:  # Acute hypoxemic respiratory failure/ ARDS  # Large exudative right pleural effusion- s/p 1.6L thoracentesis on 9/13  # CAP- strep pnuemo/ legionella negative; culture NGTD  # HTN  # Presumed COPD with exacerbation  # H/o tobacco use  # BPH  # Mild hyperglycemia, steroid induced  # PH  # Mild contraction alkalosis- diuretic induced; hemoconcentration  # Malignant pleural effusion- ADC of lung by immunostaining c/w metastatic lung cancer      PLAN:  -O2 to keep sats >88%. PAP breaks as tolerated.  -PAP prn WOB or refractory hypoxemia  -Low dose morphine prn dyspnea, pain  -Cont prednisone 40mg PO daily  -Cont Symbicort 160 BID and schedule duonebs  -Albuterol prn  -Complete ceftriaxone x 7d  -Cont home norvasc, but may require additional anti-HTN  -Hydralazine IV prn SBP >180  -Dr. Celena martinez. Appreciate assistance.  -SSI  -Diet as tolerated but make NPO if  decompensates  -Attempt OOB (dangle first) as tolerated  -PPX: lovenox and pepcid  -FULL CODE    He states that he wants to go home, understanding his time is short.  I explained to him that this would mean transitioning to comfort/ hospice, but I'm uncertain as to whether or not he will be stable enough for transfer home.  We can certainly make the transition while he is in the hospital and if he proves to be stable enough for transfer home this can occur with the assistance of home hospice.  As the patient wishes for comfort and to not be treated for his terminal lung cancer, I have also encouraged him to change his CODE STATUS to a DO NOT RESUSCITATE.  I explained what this means, but the patient stated he needed to think about it before making a change.  I have encouraged him to continue discussions with his family and it may also be useful to have a hospice representative meet with the patient.    D/w RN     Addendum: Chart reviewed.  Patient did decide on DNR and comfort measures.  He is been transferred to the floor.  He remains on high flow nasal cannula at 15 L as well as nonrebreather.  I recommend choosing nasal cannula or nonrebreather according to patient comfort and to minimize flow to that which is only necessary to maintain comfort.  Do not titrate oxygen to maintain saturations as this is not indicated with comfort measures.    I will sign off. Please call with questions or if I can be of further assistance.       Critical Care Time Spent: 28 minutes  I personally provided care to this critically ill patient as documented above.  Critical care time does not include time spent on separately billed procedures.  None of my critical care time was concurrent with other critical care providers.         This document has been electronically signed by Dasha Pierce MD on September 19, 2018 7:44 AM      [phone]    Dictated using Dragon

## 2018-09-19 NOTE — CONSULTS
"Adult Nutritionc/w Metastatic Lung   Assessment    Patient Name:  Trae De La Paz  YOB: 1935  MRN: [id number]  Admit Date:  9/11/2018    Assessment Date:  9/19/2018    Comments:  Pt still with minimal po intake due to resp distress.  Pt with Malignant Pleural effusion --ADC of lung by immunostaining c/w Metastatic Lung Cancer.  Will continue to seen nutritional supplements.  Plan of care still questionable.           Reason for Assessment     Row Name 09/19/18 1532          Reason for Assessment    Reason For Assessment follow-up protocol               Nutrition/Diet History     Row Name 09/19/18 1532          Nutrition/Diet History    Typical Food/Fluid Intake Pt still on non rebreather,  He whispered that he would like to have some watermelon.  His spouse is at bedside and will give him some Ensure.  He does like the Ensure             Anthropometrics     Row Name 09/19/18 1048          Anthropometrics    Height 172.7 cm (68\")     Weight 72.7 kg (160 lb 3.2 oz)        Ideal Body Weight (IBW)    Ideal Body Weight (IBW) (kg) 70.89     % Ideal Body Weight 102.5        Body Mass Index (BMI)    BMI (kg/m2) 24.41        IBW Adjustment, Para/Tetraplegia    5% Adjustment, Para (IBW) 67.35     10% Adjustment, Para (IBW) 63.8     10% Adjustment, Tetra (IBW) 63.8     15% Adjustment, Tetra (IBW) 60.26             Labs/Tests/Procedures/Meds     Row Name 09/19/18 1534          Labs/Procedures/Meds    Lab Results Reviewed reviewed, pertinent        Diagnostic Tests/Procedures    Diagnostic Test/Procedure Reviewed reviewed, pertinent        Medications    Pertinent Medications Reviewed reviewed, pertinent     Pertinent Medications Comments NICK brower             Physical Findings     Row Name 09/19/18 1535          Physical Findings    Overall Physical Appearance on oxygen therapy             Estimated/Assessed Needs     Row Name 09/19/18 1048          Calculation Measurements    Height 172.7 cm (68\")     " "        Nutrition Prescription Ordered     Row Name 09/19/18 1535          Nutrition Prescription PO    Current PO Diet Regular     Fluid Consistency Thin     Supplement Ensure Enlive;Ice Cream     Supplement Frequency 2 times a day;3 times a day             Evaluation of Received Nutrient/Fluid Intake     Row Name 09/19/18 1536 09/19/18 1048       Calculation Measurements    Height  -- 172.7 cm (68\")       PO Evaluation    Number of Days PO Intake Evaluated Insufficient Data  --    Number of Meals 3  --    % PO Intake 0/0/25  --            Evaluation of Prescribed Nutrient/Fluid Intake     Row Name 09/19/18 1048          Calculation Measurements    Height 172.7 cm (68\")           Electronically signed by:  Kavya Campbell RD  09/19/18 3:53 PM  "

## 2018-09-19 NOTE — PLAN OF CARE
Problem: Patient Care Overview  Goal: Plan of Care Review  Outcome: Ongoing (interventions implemented as appropriate)   09/19/18 8586   Coping/Psychosocial   Plan of Care Reviewed With caregiver;spouse;patient   Plan of Care Review   Progress no change   OTHER   Outcome Summary Pt still with minimal po intake due to resp distress. DRinking some supplements        Problem: Skin Injury Risk (Adult)  Intervention: Promote/Optimize Nutrition   09/19/18 7056   Nutrition Interventions   Oral Nutrition Promotion calorie dense foods provided;calorie dense liquids provided;nutritional therapy counseling provided

## 2018-09-19 NOTE — NURSING NOTE
Hospice referral completed with pt & wife present.  Questions answered.  Pt VAS 0, denies n/v, shortness of breath, and anxiety.  Per eMAR, no lorazepam or morphine given in past 24 hours.  Pt is on 100% non-rebreather, but denies shortness of air at this time.  Pt denies discomfort.  Explained to pt that hospice could accept him at this point as a pt at home, but he does not meet inpatient criteria at this time.  Discussed with Ashley Vargas RN clinical manager hospice, next measures.  Offered home with hospice to pt who states he wants to stay in the hospital.  Spoke with Dr. Stokes concerning pt's not meeting GIP criteria but being eligible for home with hospice and that pt does not wish to go home at this time. Informed that should any of his symptoms become uncontrolled we would be available to do another consult/ admit him to [facility] hospice services.  Marga, , made aware of pt wishes and conversation with Dr. Stokes.  PCP & case management made aware that hospice can provide the necessary oxygen support required at home should pt decide he does wish to go home with hospice at any point.  Spoke with family after discussion with Dr. Stokes for plans, also instructed to call us at the number on our pamphlet with any questions or concerns.    Thank you.

## 2018-09-19 NOTE — PLAN OF CARE
Problem: Patient Care Overview  Goal: Plan of Care Review  Outcome: Ongoing (interventions implemented as appropriate)   09/19/18 0526   Coping/Psychosocial   Plan of Care Reviewed With patient   Plan of Care Review   Progress no change   OTHER   Outcome Summary pt rested well. pt is helpful during turns. pt o2 sats decreased early into shift and was placed on bipap. pt tolerating treatment well at this time. pt fully alert and aware of new diagnosis. pt remains in good spirits. no adenocarinoma treatment has been decidied at this time.      Goal: Individualization and Mutuality  Outcome: Ongoing (interventions implemented as appropriate)    Goal: Discharge Needs Assessment  Outcome: Ongoing (interventions implemented as appropriate)    Goal: Interprofessional Rounds/Family Conf  Outcome: Ongoing (interventions implemented as appropriate)      Problem: Sepsis/Septic Shock (Adult)  Goal: Signs and Symptoms of Listed Potential Problems Will be Absent, Minimized or Managed (Sepsis/Septic Shock)  Outcome: Ongoing (interventions implemented as appropriate)      Problem: Breathing Pattern Ineffective (Adult)  Goal: Effective Oxygenation/Ventilation  Outcome: Ongoing (interventions implemented as appropriate)    Goal: Anxiety/Fear Reduction  Outcome: Ongoing (interventions implemented as appropriate)      Problem: Pneumonia (Adult)  Goal: Signs and Symptoms of Listed Potential Problems Will be Absent, Minimized or Managed (Pneumonia)  Outcome: Ongoing (interventions implemented as appropriate)      Problem: Fall Risk (Adult)  Goal: Absence of Fall  Outcome: Ongoing (interventions implemented as appropriate)      Problem: Surgery Nonspecified (Adult)  Goal: Signs and Symptoms of Listed Potential Problems Will be Absent, Minimized or Managed (Surgery Nonspecified)  Outcome: Ongoing (interventions implemented as appropriate)      Problem: Skin Injury Risk (Adult)  Goal: Identify Related Risk Factors and Signs and  Symptoms  Outcome: Ongoing (interventions implemented as appropriate)    Goal: Skin Health and Integrity  Outcome: Ongoing (interventions implemented as appropriate)

## 2018-09-20 PROBLEM — C34.90 ADENOCARCINOMA OF LUNG, STAGE 4 (HCC): Status: ACTIVE | Noted: 2018-01-01

## 2018-09-20 PROBLEM — J91.0 MALIGNANT PLEURAL EFFUSION: Chronic | Status: ACTIVE | Noted: 2018-01-01

## 2018-09-20 NOTE — PLAN OF CARE
Problem: Patient Care Overview  Goal: Plan of Care Review  Outcome: Ongoing (interventions implemented as appropriate)   09/20/18 0144   Coping/Psychosocial   Plan of Care Reviewed With patient   Plan of Care Review   Progress no change       Problem: Fall Risk (Adult)  Goal: Absence of Fall  Outcome: Ongoing (interventions implemented as appropriate)   09/20/18 0144   Fall Risk (Adult)   Absence of Fall achieves outcome       Problem: Skin Injury Risk (Adult)  Goal: Skin Health and Integrity  Outcome: Ongoing (interventions implemented as appropriate)   09/20/18 0144   Skin Injury Risk (Adult)   Skin Health and Integrity achieves outcome       Problem: Dying Patient, Actively (Adult)  Goal: Comfort/Pain Control  Outcome: Ongoing (interventions implemented as appropriate)   09/20/18 0144   Dying Patient, Actively (Adult)   Comfort/Pain Control making progress toward outcome     Goal: Peace/Preservation of Dignity During the Dying Process  Outcome: Ongoing (interventions implemented as appropriate)   09/20/18 0144   Dying Patient, Actively (Adult)   Peace/Preservation of Dignity During the Dying Process making progress toward outcome

## 2018-09-20 NOTE — PLAN OF CARE
Problem: Patient Care Overview  Goal: Plan of Care Review  Outcome: Ongoing (interventions implemented as appropriate)   09/20/18 5037   Coping/Psychosocial   Plan of Care Reviewed With patient   Plan of Care Review   Progress no change   OTHER   Outcome Summary Patient is now just on high flow nasal cannula. PO medications are being trialed to see if they keep patient comfortable.       Problem: Skin Injury Risk (Adult)  Goal: Skin Health and Integrity  Outcome: Ongoing (interventions implemented as appropriate)      Problem: Dying Patient, Actively (Adult)  Goal: Peace/Preservation of Dignity During the Dying Process  Outcome: Ongoing (interventions implemented as appropriate)

## 2018-09-20 NOTE — PROGRESS NOTES
[facility] Medicine Services  INPATIENT PROGRESS NOTE    Length of Stay: 8  Date of Admission: 9/11/2018  Primary Care Physician: Félix Tran MD    Subjective   Chief Complaint: Shortness of breath  HPI:  83 year old male with a history of HTN who presented with 1-2 days of shortness of breath and cough.  He was noted to have bilateral opacities and right pleural effusion on chest x-ray and was admitted for pneumonia.  He underwent thoracentesis and pleural fluid demonstrated adenocarcinoma. Results were shared with the patient and his wife who wished to transition to comfort measures and hospice.  Today he is on both high flow non-rebreather and high flow nasal cannula.  He reports continued shortness of breath.  Discussed this with hospice who states he is appropriate for home with hospice when symptoms are controlled.     Review of Systems   Constitutional: Positive for fatigue. Negative for chills and fever.   Respiratory: Positive for cough and shortness of breath.    Cardiovascular: Negative for chest pain.   Gastrointestinal: Negative for abdominal pain, diarrhea, nausea and vomiting.        All pertinent negatives and positives are as above. All other systems have been reviewed and are negative unless otherwise stated.     Objective    Temp:  [96.8 °F (36 °C)-97.4 °F (36.3 °C)] 97.4 °F (36.3 °C)  Heart Rate:  [77-93] 90  Resp:  [16-25] 20  BP: (124-135)/(62-80) 135/80  FiO2 (%):  [100 %] 100 %    Physical Exam   Constitutional: He is oriented to person, place, and time. He appears well-developed and well-nourished.   HENT:   Head: Normocephalic.   Pulmonary/Chest: Effort normal. No respiratory distress. He has decreased breath sounds in the right lower field and the left lower field.   Abdominal: Soft. Bowel sounds are normal. He exhibits no distension. There is no tenderness.   Musculoskeletal: He exhibits no edema or deformity.   Neurological: He is alert and  oriented to person, place, and time.   Skin: Skin is warm and dry. He is not diaphoretic.   Vitals reviewed.      Results Review:  I have reviewed the labs, radiology results, and diagnostic studies.    Laboratory Data:     Results from last 7 days  Lab Units 09/19/18  0256 09/18/18  0247 09/17/18  0320   SODIUM mmol/L 137 139 138   POTASSIUM mmol/L 4.4 4.3 4.3   CHLORIDE mmol/L 96 97 96   CO2 mmol/L 34.0* 35.0* 34.0*   BUN mg/dL 42* 43* 43*   CREATININE mg/dL 0.85 0.86 0.89   GLUCOSE mg/dL 137* 170* 204*   CALCIUM mg/dL 9.2 9.4 9.2   ANION GAP mmol/L 7.0 7.0 8.0     Estimated Creatinine Clearance: 70.1 mL/min (by C-G formula based on SCr of 0.85 mg/dL).            Results from last 7 days  Lab Units 09/19/18  0256 09/18/18  0247 09/17/18  0320 09/16/18  0441 09/15/18  0227   WBC 10*3/mm3 22.44* 20.49* 19.33* 20.85* 19.74*   HEMOGLOBIN g/dL 14.7 14.2 13.4* 13.8 13.3*   HEMATOCRIT % 43.2 42.0 39.5 40.8 39.4   PLATELETS 10*3/mm3 270 302 281 311 268           Culture Data:   No results found for: BLOODCX  No results found for: URINECX  No results found for: RESPCX  No results found for: WOUNDCX  No results found for: STOOLCX  No components found for: BODYFLD    Radiology Data:   Imaging Results (last 24 hours)     ** No results found for the last 24 hours. **          I have reviewed the patient's current medications.     Assessment/Plan     Hospital Problem List     * (Principal)Acute respiratory failure with hypoxia (CMS/HCC)    Pneumonia of right upper lobe due to infectious organism (CMS/HCC)    Sepsis (CMS/HCC)    Hypertension (Chronic)    SUMMER (acute kidney injury) (CMS/HCC)    Pleural effusion (Chronic)    Pulmonary hypertension (Chronic)    Chronic bronchitis (CMS/HCC) (Chronic)          Plan:    Plan is to obtain symptom control with PO medications prior to arranging home with hospice  Pain control/air hunger: Roxanol scheduled and PRN  Anxiety/air hunger: Concentrated liquid ativan scheduled and PRN  Wean  oxygen by comfort, not titrated to SaO2  Comfort measures  Discussed with patient and his wife at bedside          This document has been electronically signed by LYLE Carter on September 20, 2018 1:27 PM

## 2018-09-21 NOTE — DISCHARGE SUMMARY
[facility] Medicine Services  DISCHARGE SUMMARY       Date of Admission: 9/11/2018  Date of Discharge:  9/21/2018  Primary Care Physician: Félix Tran MD    Presenting Problem/History of Present Illness:  Pleural effusion [J90]  Hypoxia [R09.02]  Pneumonia of right upper lobe due to infectious organism (CMS/HCC) [J18.1]  Pneumonia of right upper lobe due to infectious organism (CMS/HCC) [J18.1]     Final Discharge Diagnoses:  Principal Problem:    Acute respiratory failure with hypoxia (CMS/HCC)  Active Problems:    Pneumonia of right upper lobe due to infectious organism (CMS/HCC)    Sepsis (CMS/HCC)    Hypertension    SUMMER (acute kidney injury) (CMS/HCC)    Malignant pleural effusion    Pulmonary hypertension    Chronic bronchitis (CMS/HCC)    Adenocarcinoma of lung, stage 4 (CMS/HCC)      Consults:   Consults     Date and Time Order Name Status Description    9/18/2018 1301 Hematology & Oncology Inpatient Consult Completed     9/14/2018 0639 Inpatient Pulmonology Consult Completed     9/12/2018 0222 Hospitalist (on-call MD unless specified)              Pertinent Test Results:   Xr Chest 2 View    Result Date: 9/12/2018  Chest 2 view on  9/12/2018 CLINICAL INDICATION: Shortness of breath, cough COMPARISON: None FINDINGS: There is a moderate size right pleural effusion that may be partially loculated superiorly and laterally. There is adjacent right-sided opacity likely representing pneumonia. Heart is upper limits normal for size. Mild bilateral interstitial opacities may be chronic in nature versus mild edema or atypical pneumonia. Vascular calcification is noted in the aorta.     Right pleural effusion with likely right-sided pneumonia. Bilateral interstitial opacities may be chronic in nature versus mild edema or atypical pneumonia. Electronically signed by:  Bin Moeller  9/12/2018 1:08 AM CDT Workstation: [id number]    Xr Chest 1 View    Result Date:  9/17/2018  Chest single view on  9/17/2018 CLINICAL INDICATION: Respiratory failure COMPARISON: 9/15/2018 FINDINGS: Heart is upper limits normal for size. There is a small right pleural effusion. There has been no significant change in left greater than right opacities consistent with asymmetric edema and/or pneumonia.     No significant change in the appearance of the chest. Electronically signed by:  Bin Moeller  9/17/2018 5:35 AM CDT Workstation: [id number]    Xr Chest 1 View    Result Date: 9/15/2018  EXAM:         Radiograph(s), Chest VIEWS:   Frontal  ; 1     DATE/TIME:  9/15/2018 8:00 PM CDT            INDICATION:   possible xray., J18.1 Lobar pneumonia, unspecified organism R09.02 Hypoxemia J90 Pleural effusion, not elsewhere classified  COMPARISON:  CXR: 9/15/18 at 07:46 hours         FINDINGS:         - lines/tubes:    none   - cardiac:         size within normal limits       - mediastinum: contour within normal limits       - lungs:         Bilateral airspace disease, radiographically unchanged.           - pleura:         no evidence of  fluid                - osseous:         unremarkable for age                - misc.:        CONCLUSION:    1. Bilateral airspace disease, radiographically unchanged.                                                  Electronically signed by:  HILLARY Barth MD  9/15/2018 8:06 PM CDT Workstation: [id number]    Xr Chest 1 View    Result Date: 9/15/2018  [facility] Patient Name: BREN GARCIA ORDERING: PENNY HERNANDEZ ATTENDING: HECTOR MADRIGAL REFERRING: PENNY HERNANDEZ ----------------------- PROCEDURE: Portable chest x-ray TECHNIQUE: Single AP view of the chest COMPARISON: 9/14/2018 HISTORY: acute hypoxic respiratory failure, J18.1 Lobar pneumonia, unspecified organism R09.02 Hypoxemia J90 Pleural effusion, not elsewhere classified FINDINGS:  Life-support devices: None Lungs/pleura: Consolidation in the left lung shows no significant  change. Opacification in the periphery of the mid to upper right lung and right lung base is similar to the prior exam. Heart, hilar and mediastinal structures: Stable accounting for differences in projection and technique.     CONCLUSION: Consolidation in the left lung shows no significant change. Opacification in the periphery of the mid to upper right lung and right lung base is similar to the prior exam. Electronically signed by:  José Miguel Campbell MD  9/15/2018 8:30 AM CDT Workstation: [id number]    Xr Chest 1 View    Result Date: 9/14/2018  PROCEDURE: Single chest view portable REASON FOR EXAM:R pleural effusion, J18.1 Lobar pneumonia, unspecified organism R09.02 Hypoxemia J90 Pleural effusion, not elsewhere classified FINDINGS: Comparison exam dated September 13, 2018. Cardiac size appears within normal limits. Bilateral perihilar haziness left side worse than right. Left upper lobe and left lung base interstitial opacities. Right upper lobe peripheral patchy opacity. Lungs are otherwise clear. Small right pleural effusion. No acute osseous abnormality.     1.  Bilateral perihilar haziness right worse than left with associated left upper lobe and left lung base interstitial opacities. This may represent changes from pulmonary edema and/or pneumonia. 2.  Right upper lobe peripheral patchy opacity suspicious for pneumonia. 3.  Small right pleural effusion. Electronically signed by:  Mohit Ca MD  9/14/2018 7:38 AM CDT Workstation: [id number]    Xr Chest 1 View    Result Date: 9/13/2018  EXAM:         Radiograph(s), Chest VIEWS:   Frontal  ; 1     DATE/TIME:  9/13/2018 2:35 PM CDT            INDICATION:   Post right thoracentesis per Dr. Campbell., J18.1 Lobar pneumonia, unspecified organism R09.02 Hypoxemia J90 Pleural effusion, not elsewhere classified  COMPARISON:  CXR: 9/12/18         FINDINGS:         - lines/tubes:    none   - cardiac:         size within normal limits       - mediastinum: contour within normal limits        - lungs:         Bilateral airspace disease, improved since the previous study.           - pleura:         no evidence of  fluid                - osseous:         unremarkable for age                - misc.:        CONCLUSION:    1. Improving bilateral airspace disease.                                                   Electronically signed by:  HILLARY Barth MD  9/13/2018 2:36 PM CDT Workstation: [id number]    Xr Chest 1 View    Result Date: 9/12/2018  PROCEDURE: Chest, AP upright portable at 8:53 PM. INDICATION: Dyspnea, J18.1 Lobar pneumonia, unspecified organism R09.02 Hypoxemia J90 Pleural effusion, not elsewhere classified COMPARISON: Exam earlier same date at 2:02 PM. Mild cardiomegaly with increase in pulmonary vascular congestion. Mild cardiomegaly. Persistent consolidation and/or volume loss in the right lower lobe with associated moderate right pleural effusion. Also some consolidation and infiltrate peripherally right upper lobe. New infiltrate developing in the left lower lobe.     Persistent consolidation and volume loss right lower lobe. No change consolidation infiltrate peripherally in the right upper lobe. No change in moderate-sized right pleural effusion. New Infiltrate developing left lower lobe. Increasing pulmonary vascular congestion. [id number] Electronically signed by:  Félix Nicolas MD  9/12/2018 9:13 PM CDT Workstation: [id number]    Xr Chest 1 View    Result Date: 9/12/2018  EXAM:         Radiograph(s), Chest VIEWS:   Frontal  ; 1     DATE/TIME:  9/12/2018 2:29 PM CDT            INDICATION:   severe shortness of breath, J18.1 Lobar pneumonia, unspecified organism R09.02 Hypoxemia J90 Pleural effusion, not elsewhere classified  COMPARISON:  CXR: 9/12/18 at 12:59 hours         FINDINGS:         - lines/tubes:    none   - cardiac:         size within normal limits       - mediastinum: contour within normal limits       - lungs:         Consolidated airspace disease in the right  base, and right upper lung. The left lung is essentially clear and unchanged.           - pleura:         Small amount of right-sided pleural fluid.               - osseous:         unremarkable for age                - misc.:        CONCLUSION:    1. Consolidated airspace disease in the right lung. 2. Small amount right-sided pleural fluid.                                                   Electronically signed by:  HILLARY Barth MD  9/12/2018 2:30 PM CDT Workstation: [id number]    Us Thoracentesis    Result Date: 9/13/2018  PROCEDURE: Ultrasound guided thoracentesis HISTORY:  Right pleural effusion COMPARISON:  none TECHNIQUE:  Informed consent was obtained following a discussion of the procedure with the patient, with benefits, alternatives, and with risks explained as possible bleeding, infection, damage to adjacent organs and pneumothorax which would require a chest tube. An appropriate site was marked in the posterior right hemithorax. Following initial imaging for localization of an optimal site of intervention, the skin was prepped and draped in the usual sterile fashion.  10 mL of lidocaine was used for local anesthesia. A 19-gauge [employer] needle and catheter was inserted into the pleural space under ultrasound guidance. The needle was removed and fluid was collected into a vacuum bottle. FINDINGS: Approximately 1600 mL of serous fluid was collected and submitted for laboratory evaluation, as requested. The patient tolerated the procedure well and left the department in stable condition. No immediate post procedure complications.     CONCLUSION: Successful ultrasound-guided thoracentesis with removal of 1600 mL of serous fluid, as described above. Electronically signed by:  José Miguel Campbell MD  9/13/2018 4:15 PM CDT Workstation: [id number]    Ct Angiogram Chest With Contrast    Result Date: 9/13/2018  CT angiogram chest with contrast on 9/13/2018 CLINICAL INDICATION: Respiratory failure, elevated d-dimer TECHNIQUE:  Multiple axial images are obtained throughout the chest following the administration of IV contrast.  Computer generated 3D reconstructions/MIPS were performed. This exam was performed according to our departmental dose-optimization program, which includes automated exposure control, adjustment of the mA and/or kV according to patient size and/or use of iterative reconstruction technique. Total DLP is 432.6 mGy*cm. COMPARISON: None FINDINGS: There is a large right pleural effusion. There is slight shift of the heart and mediastinum to the left related to this large right pleural effusion. There is some partial aeration of the right upper and right middle lobe with complete atelectasis of the right lower lobe. There are areas of atelectasis in the right upper and right middle lobe as well. There is patchy groundglass and airspace opacity in the left lung consistent with edema and/or pneumonia. There is anterior to posterior narrowing of the trachea and mainstem bronchi suggesting tracheobronchomalacia. There is no left pleural effusion or pericardial effusion. There is prominence of the main and central pulmonary arteries suggesting pulmonary arterial hypertension. There are no filling defects within the pulmonary arteries to suggest a pulmonary embolus. Limited visualized upper abdomen is unremarkable. There is no thoracic adenopathy. Degenerative changes are noted in the spine.     1. No evidence of pulmonary embolus. 2. Large right pleural effusion with atelectasis of much of the right lung. Consider thoracentesis. 3. Patchy left-sided opacity consistent with edema and/or pneumonia. Electronically signed by:  Bin Moeller  9/13/2018 2:37 AM CDT Workstation: [id number]        HPI/Hospital Course:  The patient is a 83 y.o. male with a history of HTN who presented to [facility] with 1-2 days of shortness of breath and cough.  He was noted to have bilateral opacities and right pleural  "effusion on chest x-ray and was admitted for pneumonia.  He underwent thoracentesis and pleural fluid demonstrated adenocarcinoma.  Results and prognosis for this were discussed with the patient and his wife and the decision was made to pursue comfort care only and hospice. The original goal was for him to return home with hospice, but despite the use of concentrated PO morphine and ativan for symptom control along with high flow-oxygen the patient's symptoms remained uncontrolled. He has been started on PCA morphine infusion for control of air hunger.  He was reevaluated by hospice who recommend GIP admission for hospice.    Condition on Discharge:  Hospice    Physical Exam on Discharge:  /62 (BP Location: Right arm, Patient Position: Lying)   Pulse 100   Temp 97.2 °F (36.2 °C) (Axillary)   Resp 20   Ht 172.7 cm (68\")   Wt 71.2 kg (157 lb)   SpO2 (!) 89%   BMI 23.87 kg/m²   Physical Exam  Constitutional: He is oriented to person, place, and time. He appears well-developed and well-nourished.   HENT:   Head: Normocephalic.   Pulmonary/Chest: Accessory muscle usage present. No respiratory distress. He has decreased breath sounds in the right lower field and the left lower field.   Abdominal: Soft. Bowel sounds are normal. He exhibits no distension. There is no tenderness.   Musculoskeletal: He exhibits no edema or deformity.   Neurological: He is alert and oriented to person, place, and time.   Skin: Skin is warm and dry. He is not diaphoretic.   Vitals reviewed.    Discharge Disposition:  Hospice/Medical Facility ([facility])    Discharge Medications:  Scheduled Meds:  sodium chloride      famotidine 20 mg Oral Daily   LORazepam 1 mg Intravenous Q8H   tamsulosin 0.4 mg Oral Nightly     Continuous Infusions:  Morphine      PRN Meds:.acetaminophen  •  albuterol  •  LORazepam  •  LORazepam  •  Morphine  •  naloxone  •  ondansetron  •  promethazine  •  sodium chloride  •  Insert peripheral IV " **AND** sodium chloride  •  traZODone      Discharge Care Plan/Instructions: Admit to [facility]    Follow-up Appointments:   No future appointments.    LYLE Carter  09/21/18  3:42 PM     Time: Discharge planning and teaching took greater than 30 minutes.     ATTESTATION  I agree with the discharge summary as documented by Toni ALVARENGA.

## 2018-09-21 NOTE — PLAN OF CARE
Problem: Patient Care Overview  Goal: Plan of Care Review  Outcome: Ongoing (interventions implemented as appropriate)   09/21/18 2400   Coping/Psychosocial   Plan of Care Reviewed With patient   OTHER   Outcome Summary pt placed on morphine pca. pt taken off of high flow cannula and left on non-rebreather.     Goal: Individualization and Mutuality  Outcome: Ongoing (interventions implemented as appropriate)    Goal: Discharge Needs Assessment  Outcome: Ongoing (interventions implemented as appropriate)    Goal: Interprofessional Rounds/Family Conf  Outcome: Ongoing (interventions implemented as appropriate)      Problem: Sepsis/Septic Shock (Adult)  Goal: Signs and Symptoms of Listed Potential Problems Will be Absent, Minimized or Managed (Sepsis/Septic Shock)  Outcome: Ongoing (interventions implemented as appropriate)      Problem: Breathing Pattern Ineffective (Adult)  Goal: Effective Oxygenation/Ventilation  Outcome: Ongoing (interventions implemented as appropriate)    Goal: Anxiety/Fear Reduction  Outcome: Ongoing (interventions implemented as appropriate)      Problem: Pneumonia (Adult)  Goal: Signs and Symptoms of Listed Potential Problems Will be Absent, Minimized or Managed (Pneumonia)  Outcome: Ongoing (interventions implemented as appropriate)      Problem: Fall Risk (Adult)  Goal: Absence of Fall  Outcome: Ongoing (interventions implemented as appropriate)      Problem: Surgery Nonspecified (Adult)  Goal: Signs and Symptoms of Listed Potential Problems Will be Absent, Minimized or Managed (Surgery Nonspecified)  Outcome: Ongoing (interventions implemented as appropriate)      Problem: Skin Injury Risk (Adult)  Goal: Identify Related Risk Factors and Signs and Symptoms  Outcome: Ongoing (interventions implemented as appropriate)      Problem: Dying Patient, Actively (Adult)  Goal: Identify Related Risk Factors and Signs and Symptoms  Outcome: Ongoing (interventions implemented as appropriate)

## 2018-09-21 NOTE — PROGRESS NOTES
[facility] Medicine Services  INPATIENT PROGRESS NOTE    Length of Stay: 9  Date of Admission: 9/11/2018  Primary Care Physician: Félix Tran MD    Subjective   Chief Complaint: Shortness of breath  HPI:  83 year old male with a history of HTN who presented with 1-2 days of shortness of breath and cough.  He was noted to have bilateral opacities and right pleural effusion on chest x-ray and was admitted for pneumonia.  He underwent thoracentesis and pleural fluid demonstrated adenocarcinoma. Results were shared with the patient and his wife who wished to transition to comfort measures and hospice.  He was evaluated by hospice who recommended home with hospice.  He was started on concentrated PO morphine and ativan for symptom control and O2 rate was weaned based on comfort.  The patient became very anxious and had significant uncontrolled shortness of breath which improved with increased O2 flow and IV ativan.  He demonstrates uncontrolled air hunger.     Review of Systems   Constitutional: Positive for fatigue. Negative for chills and fever.   Respiratory: Positive for cough and shortness of breath.    Cardiovascular: Negative for chest pain.   Gastrointestinal: Negative for abdominal pain, diarrhea, nausea and vomiting.        All pertinent negatives and positives are as above. All other systems have been reviewed and are negative unless otherwise stated.     Objective    Temp:  [97.7 °F (36.5 °C)-97.9 °F (36.6 °C)] 97.7 °F (36.5 °C)  Heart Rate:  [] 80  Resp:  [18-20] 20  BP: (131-140)/(58-66) 131/66    Physical Exam   Constitutional: He is oriented to person, place, and time. He appears well-developed and well-nourished.   HENT:   Head: Normocephalic.   Pulmonary/Chest: Accessory muscle usage present. No respiratory distress. He has decreased breath sounds in the right lower field and the left lower field.   Abdominal: Soft. Bowel sounds are normal. He  exhibits no distension. There is no tenderness.   Musculoskeletal: He exhibits no edema or deformity.   Neurological: He is alert and oriented to person, place, and time.   Skin: Skin is warm and dry. He is not diaphoretic.   Vitals reviewed.      Results Review:  I have reviewed the labs, radiology results, and diagnostic studies.    Laboratory Data:     Results from last 7 days  Lab Units 09/19/18  0256 09/18/18  0247 09/17/18  0320   SODIUM mmol/L 137 139 138   POTASSIUM mmol/L 4.4 4.3 4.3   CHLORIDE mmol/L 96 97 96   CO2 mmol/L 34.0* 35.0* 34.0*   BUN mg/dL 42* 43* 43*   CREATININE mg/dL 0.85 0.86 0.89   GLUCOSE mg/dL 137* 170* 204*   CALCIUM mg/dL 9.2 9.4 9.2   ANION GAP mmol/L 7.0 7.0 8.0     Estimated Creatinine Clearance: 68.6 mL/min (by C-G formula based on SCr of 0.85 mg/dL).            Results from last 7 days  Lab Units 09/19/18  0256 09/18/18  0247 09/17/18  0320 09/16/18  0441 09/15/18  0227   WBC 10*3/mm3 22.44* 20.49* 19.33* 20.85* 19.74*   HEMOGLOBIN g/dL 14.7 14.2 13.4* 13.8 13.3*   HEMATOCRIT % 43.2 42.0 39.5 40.8 39.4   PLATELETS 10*3/mm3 270 302 281 311 268           Culture Data:   No results found for: BLOODCX  No results found for: URINECX  No results found for: RESPCX  No results found for: WOUNDCX  No results found for: STOOLCX  No components found for: BODYFLD    Radiology Data:   Imaging Results (last 24 hours)     ** No results found for the last 24 hours. **          I have reviewed the patient's current medications.     Assessment/Plan     Hospital Problem List     * (Principal)Acute respiratory failure with hypoxia (CMS/HCC)    Pneumonia of right upper lobe due to infectious organism (CMS/HCC)    Sepsis (CMS/HCC)    Hypertension (Chronic)    SUMMER (acute kidney injury) (CMS/HCC)    Malignant pleural effusion (Chronic)    Pulmonary hypertension (Chronic)    Chronic bronchitis (CMS/HCC) (Chronic)    Adenocarcinoma of lung, stage 4 (CMS/HCC)          Plan:    Pain control/air hunger: Start  Morphine PCA and IV PRN morphine for uncontrolled ar hunger  Anxiety/air hunger: Concentrated liquid ativan scheduled and IV ativan PRN  Wean oxygen by comfort, not titrated to SaO2, he seems more comfortable with mask  Comfort measures  Discussed with patient and his wife at bedside          This document has been electronically signed by LYLE Carter on September 21, 2018 11:01 AM      I agree with the progress note as documented by Toni ALVARENGA.

## 2018-09-21 NOTE — CONSULTS
Nutrition Services    Patient Name:  Trae De La Paz  YOB: 1935  MRN: [id number]  Admit Date:  9/11/2018     Pt resting.  He has been made comfort care and will be discharged home with Hospice.      Electronically signed by:  Kavya Campbell RD  09/21/18 3:46 PM

## 2018-09-21 NOTE — PLAN OF CARE
Problem: Patient Care Overview  Goal: Plan of Care Review  Outcome: Ongoing (interventions implemented as appropriate)   09/21/18 0228   Coping/Psychosocial   Plan of Care Reviewed With patient;spouse   Plan of Care Review   Progress no change     Goal: Individualization and Mutuality  Outcome: Ongoing (interventions implemented as appropriate)    Goal: Discharge Needs Assessment  Outcome: Ongoing (interventions implemented as appropriate)    Goal: Interprofessional Rounds/Family Conf  Outcome: Ongoing (interventions implemented as appropriate)      Problem: Sepsis/Septic Shock (Adult)  Goal: Signs and Symptoms of Listed Potential Problems Will be Absent, Minimized or Managed (Sepsis/Septic Shock)  Outcome: Ongoing (interventions implemented as appropriate)      Problem: Breathing Pattern Ineffective (Adult)  Goal: Effective Oxygenation/Ventilation  Outcome: Ongoing (interventions implemented as appropriate)    Goal: Anxiety/Fear Reduction  Outcome: Ongoing (interventions implemented as appropriate)      Problem: Pneumonia (Adult)  Goal: Signs and Symptoms of Listed Potential Problems Will be Absent, Minimized or Managed (Pneumonia)  Outcome: Ongoing (interventions implemented as appropriate)      Problem: Fall Risk (Adult)  Goal: Absence of Fall  Outcome: Ongoing (interventions implemented as appropriate)      Problem: Surgery Nonspecified (Adult)  Goal: Signs and Symptoms of Listed Potential Problems Will be Absent, Minimized or Managed (Surgery Nonspecified)  Outcome: Ongoing (interventions implemented as appropriate)      Problem: Skin Injury Risk (Adult)  Goal: Identify Related Risk Factors and Signs and Symptoms  Outcome: Ongoing (interventions implemented as appropriate)    Goal: Skin Health and Integrity  Outcome: Ongoing (interventions implemented as appropriate)      Problem: Dying Patient, Actively (Adult)  Goal: Identify Related Risk Factors and Signs and Symptoms  Outcome: Ongoing (interventions  implemented as appropriate)    Goal: Comfort/Pain Control  Outcome: Ongoing (interventions implemented as appropriate)    Goal: Peace/Preservation of Dignity During the Dying Process  Outcome: Ongoing (interventions implemented as appropriate)

## 2018-09-22 PROBLEM — Z51.5 END OF LIFE CARE: Status: ACTIVE | Noted: 2018-01-01

## 2018-09-22 NOTE — PROGRESS NOTES
Anel Ryan RN Hosp[facility]  Death attendance. Sympathies extended. Effective grieving. Family has chosen [facility] services thru [facility]. Colloaborated with nurse Rendon. Provisional death completed.

## 2018-09-22 NOTE — PLAN OF CARE
Problem: Patient Care Overview  Goal: Plan of Care Review  Outcome: Ongoing (interventions implemented as appropriate)   09/22/18 0125   Coping/Psychosocial   Plan of Care Reviewed With patient   Plan of Care Review   Progress declining   OTHER   Outcome Summary Pt madde hospice 9/21/18. O2 source reduced to nonrebreather only. Pt has been pulling at lines/tubes for much of the night. Has required multiple doses of prn meds this shift.

## 2018-09-22 NOTE — DISCHARGE SUMMARY
[facility] Medicine Admission    History and Physical with Death Summary    Date of Admission: 9/21/2018  Date of Death:  9/22/2018 at approximately 0710  Primary Care Physician: Félix Tran MD      Chief Complaint: Hospice, Lung cancer stage IV    HPI:  The patient is a 83 y.o. male with a history of HTN who presented to [facility] with 1-2 days of shortness of breath and cough.  He was noted to have bilateral opacities and right pleural effusion on chest x-ray and was admitted for pneumonia.  He underwent thoracentesis and pleural fluid demonstrated adenocarcinoma.  Results and prognosis for this were discussed with the patient and his wife and the decision was made to pursue comfort care only and hospice. The original goal was for him to return home with hospice, but despite the use of concentrated PO morphine and ativan for symptom control along with high flow-oxygen the patient's symptoms remained uncontrolled. He has been started on PCA morphine infusion for control of air hunger.  He was reevaluated by hospice who recommend [facility] admission for hospice.    Concurrent Medical History:  has a past medical history of Hypertension.    Past Surgical History:   Past Surgical History:   Procedure Laterality Date   • HERNIA REPAIR         Family History: No family history on file.    Social History:   Social History     Social History   • Marital status:      Spouse name: N/A   • Number of children: N/A   • Years of education: N/A     Occupational History   • Not on file.     Social History Main Topics   • Smoking status: Current Some Day Smoker   • Smokeless tobacco: Not on file      Comment: pt quit 40 years ago   • Alcohol use Yes      Comment: occasionaly   • Drug use: Unknown   • Sexual activity: Defer     Other Topics Concern   • Not on file     Social History Narrative   • No narrative on file       Allergies:   Allergies   Allergen  Reactions   • Iodine Hives       Medications:   Patient is      Review of Systems:  Review of Systems   Unable to be performed as patient  prior to admission examination on 18    Physical Exam:   Physical Exam  Unable to be performed as patient  prior to admission examination on 18      Presenting Problem/History of Present Illness:  End of life care [Z51.5]     Final Death Diagnoses:  Principal Problem:    Adenocarcinoma of lung, stage 4 (CMS/[facility])  Active Problems:    Pneumonia of right upper lobe due to infectious organism (CMS/[facility])    Malignant pleural effusion    Acute respiratory failure with hypoxia (CMS/[facility])    End of life care      Hospital Course:  The patient is a 83 y.o. male was admitted for [facility] hospice care for uncontrolled air hunger secondary to stage 4 adenocarcinoma of the lung and presumed post-obstructive pneumonia. His symptoms were managed with Morphine PCA infusion and PRN morphine for air hunger and ativan for anxiety.  Symptoms were controlled with this and the patient  as a result of his illnesses on 18 at 0710. His wife was at the bedside.               This document has been electronically signed by LYLE Carter on 2018 1:22 PM      ATTESTATION  I agree with the discharge/death summary as documented by Chago ALVARENGA

## 2018-09-22 NOTE — NURSING NOTE
Patient's wife observed attempting to replace nonrebreather. Patient is combative and attempting to crawl out of the bed at this time. Prn morphine given at this time. Pt appears to be resting comfortably at this time.

## 2018-09-22 NOTE — H&P
[facility] Medicine Admission    History and Physical with Death Summary    Date of Admission: 9/21/2018  Date of Death:  9/22/2018 at approximately 0710  Primary Care Physician: Félix Tran MD      Chief Complaint: Hospice, Lung cancer Stage IV    HPI:  The patient is a 83 y.o. male with a history of HTN who presented to [facility] with 1-2 days of shortness of breath and cough.  He was noted to have bilateral opacities and right pleural effusion on chest x-ray and was admitted for pneumonia.  He underwent thoracentesis and pleural fluid demonstrated adenocarcinoma.  Results and prognosis for this were discussed with the patient and his wife and the decision was made to pursue comfort care only and hospice. The original goal was for him to return home with hospice, but despite the use of concentrated PO morphine and ativan for symptom control along with high flow-oxygen the patient's symptoms remained uncontrolled. He has been started on PCA morphine infusion for control of air hunger.  He was reevaluated by hospice who recommend [facility] admission for hospice.    Concurrent Medical History:  has a past medical history of Hypertension.    Past Surgical History:   Past Surgical History:   Procedure Laterality Date   • HERNIA REPAIR         Family History: No family history on file.    Social History:   Social History     Social History   • Marital status:      Spouse name: N/A   • Number of children: N/A   • Years of education: N/A     Occupational History   • Not on file.     Social History Main Topics   • Smoking status: Current Some Day Smoker   • Smokeless tobacco: Not on file      Comment: pt quit 40 years ago   • Alcohol use Yes      Comment: occasionaly   • Drug use: Unknown   • Sexual activity: Defer     Other Topics Concern   • Not on file     Social History Narrative   • No narrative on file       Allergies:   Allergies   Allergen  Reactions   • Iodine Hives       Medications:   Patient is      Review of Systems:  Review of Systems   Unable to be performed as patient  prior to admission examination on 18    Physical Exam:   Physical Exam  Unable to be performed as patient  prior to admission examination on 18      Presenting Problem/History of Present Illness:  End of life care [Z51.5]     Final Death Diagnoses:  Principal Problem:    Adenocarcinoma of lung, stage 4 (CMS/[facility])  Active Problems:    Pneumonia of right upper lobe due to infectious organism (CMS/[facility])    Malignant pleural effusion    Acute respiratory failure with hypoxia (CMS/[facility])    End of life care      Hospital Course:  The patient is a 83 y.o. male was admitted for [facility] hospice care for uncontrolled air hunger secondary to stage 4 adenocarcinoma of the lung and presumed post-obstructive pneumonia. His symptoms were managed with Morphine PCA infusion and PRN morphine for air hunger and ativan for anxiety.  Symptoms were controlled with this and the patient  as a result of his illnesses on 18 at 0710. His wife was at the bedside.               This document has been electronically signed by LYLE Carter on 2018 1:22 PM      ATTESTATION  I agree with the history and death summary as documented by Chago ALVARENGA

## 2018-09-22 NOTE — NURSING NOTE
Pt has been anxious, soa, and pulling at lines and removing oxygen for much of this shift. Has required an increased PCA morphine dose, several prn morphine pushes, and prn ativan. Pt now appears to be resting comfortably.